# Patient Record
Sex: MALE | Race: ASIAN | NOT HISPANIC OR LATINO | Employment: OTHER | URBAN - METROPOLITAN AREA
[De-identification: names, ages, dates, MRNs, and addresses within clinical notes are randomized per-mention and may not be internally consistent; named-entity substitution may affect disease eponyms.]

---

## 2017-06-15 ASSESSMENT — ENCOUNTER SYMPTOMS
NECK PAIN: 0
COUGH DISTURBING SLEEP: 1
POSTURAL DYSPNEA: 0
HEMATURIA: 0
ABDOMINAL PAIN: 0
BOWEL INCONTINENCE: 0
RECTAL PAIN: 0
NAUSEA: 0
BACK PAIN: 1
ARTHRALGIAS: 0
SINUS PAIN: 0
JOINT SWELLING: 0
SNORES LOUDLY: 1
TASTE DISTURBANCE: 0
HEARTBURN: 0
NECK MASS: 0
WHEEZING: 0
HOARSE VOICE: 1
TROUBLE SWALLOWING: 0
MUSCLE WEAKNESS: 0
COUGH: 1
DYSPNEA ON EXERTION: 0
JAUNDICE: 0
FLANK PAIN: 0
SINUS CONGESTION: 0
VOMITING: 0
SHORTNESS OF BREATH: 0
STIFFNESS: 0
RECTAL BLEEDING: 0
MUSCLE CRAMPS: 0
SORE THROAT: 0
MYALGIAS: 0
SPUTUM PRODUCTION: 1
RESPIRATORY PAIN: 0
CONSTIPATION: 1
HEMOPTYSIS: 0
DIFFICULTY URINATING: 0
DYSURIA: 0
BLOOD IN STOOL: 0
DIARRHEA: 0
SMELL DISTURBANCE: 0
BLOATING: 0

## 2017-06-20 ENCOUNTER — APPOINTMENT (OUTPATIENT)
Dept: LAB | Facility: CLINIC | Age: 77
End: 2017-06-20
Attending: PHYSICIAN ASSISTANT
Payer: COMMERCIAL

## 2017-06-20 ENCOUNTER — ONCOLOGY VISIT (OUTPATIENT)
Dept: ONCOLOGY | Facility: CLINIC | Age: 77
End: 2017-06-20
Attending: PHYSICIAN ASSISTANT
Payer: COMMERCIAL

## 2017-06-20 VITALS
TEMPERATURE: 98.2 F | SYSTOLIC BLOOD PRESSURE: 109 MMHG | WEIGHT: 141.3 LBS | HEART RATE: 80 BPM | RESPIRATION RATE: 14 BRPM | DIASTOLIC BLOOD PRESSURE: 70 MMHG | OXYGEN SATURATION: 96 % | HEIGHT: 65 IN | BODY MASS INDEX: 23.54 KG/M2

## 2017-06-20 DIAGNOSIS — C12 SQUAMOUS CELL CARCINOMA OF PYRIFORM SINUS (H): Primary | ICD-10-CM

## 2017-06-20 DIAGNOSIS — C12 MALIGNANT NEOPLASM OF PYRIFORM SINUS (H): ICD-10-CM

## 2017-06-20 LAB
ALBUMIN SERPL-MCNC: 3.7 G/DL (ref 3.4–5)
ALP SERPL-CCNC: 59 U/L (ref 40–150)
ALT SERPL W P-5'-P-CCNC: 22 U/L (ref 0–70)
ANION GAP SERPL CALCULATED.3IONS-SCNC: 7 MMOL/L (ref 3–14)
AST SERPL W P-5'-P-CCNC: 16 U/L (ref 0–45)
BASOPHILS # BLD AUTO: 0 10E9/L (ref 0–0.2)
BASOPHILS NFR BLD AUTO: 0.4 %
BILIRUB SERPL-MCNC: 0.6 MG/DL (ref 0.2–1.3)
BUN SERPL-MCNC: 14 MG/DL (ref 7–30)
CALCIUM SERPL-MCNC: 9 MG/DL (ref 8.5–10.1)
CHLORIDE SERPL-SCNC: 102 MMOL/L (ref 94–109)
CO2 SERPL-SCNC: 26 MMOL/L (ref 20–32)
CREAT SERPL-MCNC: 0.8 MG/DL (ref 0.66–1.25)
DIFFERENTIAL METHOD BLD: NORMAL
EOSINOPHIL # BLD AUTO: 0.2 10E9/L (ref 0–0.7)
EOSINOPHIL NFR BLD AUTO: 3.7 %
ERYTHROCYTE [DISTWIDTH] IN BLOOD BY AUTOMATED COUNT: 13.2 % (ref 10–15)
GFR SERPL CREATININE-BSD FRML MDRD: ABNORMAL ML/MIN/1.7M2
GLUCOSE SERPL-MCNC: 113 MG/DL (ref 70–99)
HCT VFR BLD AUTO: 41.3 % (ref 40–53)
HGB BLD-MCNC: 14.2 G/DL (ref 13.3–17.7)
IMM GRANULOCYTES # BLD: 0 10E9/L (ref 0–0.4)
IMM GRANULOCYTES NFR BLD: 0.2 %
LYMPHOCYTES # BLD AUTO: 1.1 10E9/L (ref 0.8–5.3)
LYMPHOCYTES NFR BLD AUTO: 24.4 %
MCH RBC QN AUTO: 31.2 PG (ref 26.5–33)
MCHC RBC AUTO-ENTMCNC: 34.4 G/DL (ref 31.5–36.5)
MCV RBC AUTO: 91 FL (ref 78–100)
MONOCYTES # BLD AUTO: 0.4 10E9/L (ref 0–1.3)
MONOCYTES NFR BLD AUTO: 8 %
NEUTROPHILS # BLD AUTO: 2.9 10E9/L (ref 1.6–8.3)
NEUTROPHILS NFR BLD AUTO: 63.3 %
NRBC # BLD AUTO: 0 10*3/UL
NRBC BLD AUTO-RTO: 0 /100
PLATELET # BLD AUTO: 163 10E9/L (ref 150–450)
POTASSIUM SERPL-SCNC: 4 MMOL/L (ref 3.4–5.3)
PROT SERPL-MCNC: 6.8 G/DL (ref 6.8–8.8)
RBC # BLD AUTO: 4.55 10E12/L (ref 4.4–5.9)
SODIUM SERPL-SCNC: 135 MMOL/L (ref 133–144)
T4 FREE SERPL-MCNC: 0.82 NG/DL (ref 0.76–1.46)
TSH SERPL DL<=0.005 MIU/L-ACNC: 4.52 MU/L (ref 0.4–4)
WBC # BLD AUTO: 4.6 10E9/L (ref 4–11)

## 2017-06-20 PROCEDURE — 85025 COMPLETE CBC W/AUTO DIFF WBC: CPT | Performed by: INTERNAL MEDICINE

## 2017-06-20 PROCEDURE — 80053 COMPREHEN METABOLIC PANEL: CPT | Performed by: INTERNAL MEDICINE

## 2017-06-20 PROCEDURE — 84443 ASSAY THYROID STIM HORMONE: CPT | Performed by: INTERNAL MEDICINE

## 2017-06-20 PROCEDURE — 36415 COLL VENOUS BLD VENIPUNCTURE: CPT | Performed by: INTERNAL MEDICINE

## 2017-06-20 PROCEDURE — 84439 ASSAY OF FREE THYROXINE: CPT | Performed by: INTERNAL MEDICINE

## 2017-06-20 PROCEDURE — 99212 OFFICE O/P EST SF 10 MIN: CPT | Mod: ZF

## 2017-06-20 PROCEDURE — 99214 OFFICE O/P EST MOD 30 MIN: CPT | Mod: ZP | Performed by: PHYSICIAN ASSISTANT

## 2017-06-20 ASSESSMENT — PAIN SCALES - GENERAL: PAINLEVEL: NO PAIN (0)

## 2017-06-20 NOTE — LETTER
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  -At the beginning of 2015 he developed left-sided odynophagia. He saw Dr. Cailin Rivera in ENT in [place], who performed a flexible endoscopy which revealed a lesion in the left false vocal cord and postcricoid region.   - 03/18/15 diagnosed with SCC (keratinizing type with ulcerstion) by Left supraglottic bx   - There was submucosal edema and fullness with tumor at the left arytenoid and aryepiglottic fold extending into the left piriform sinus. There was an exophytic lesion visualized in the base of the piriform sinus and extending up to the lateral left postcricoid region.   - 03/24/2015 PET/CT showed 15 mm hypermetabolic mass in the left piriform sinus and 16 mm hypermetabolic left level IIA lymph node. No distant mets. There was nonspecific focal FDG uptake in the hilar regions without gross segundo formation, however evaluation on CT is partially limited given lack of IV contrast.   - Stage was NORAH ([id number])  - 4/15/15 started chemoradiation with HD Cisplatin. He was switched to weekly cisplatin with 40 mg /m2 on 5/27/15 for worsening hearing loss and tinnitus.   - 6/3/2015 completed therapy  - 8/27/15 PET scan showed a residual activity in the neck LN. Also there were multiple concerning hilar LN.   - 9/9/15 left modified radical neck dissection. Pathology negative for viable tumor  -scans most recently 12/2016 no signs of recurrence on CT chest and CT neck.     Interval History: Chema is here with his son and wife in routine follow-up. He was last seen by Dr. Willingham 6 months ago. He spent the last 4.5  "months in [facility], returning 3 weeks ago. He had bronchitis vs early PNA in [facility] and was treated with antibiotics. He had CXR showing resolution. He otherwise has no health updates. He overall has been doing well. He continues to have intermittent cough from PND that has been stable since treatment. His swallowing, dysgeusia, and dysphagia have improved. He uses pilocarpine for dry mouth which helps.  He denies any throat pain or sores or new lumps bumps in the head and neck region. No SOB, COLE or chest pain/pressure. He is eating and drinking well. Has some constipation which is managed with stool softeners once a week.    Review Of Systems  10-point review of systems were negative except as noted in HPI.    EXAM:  Blood pressure 109/70, pulse 80, temperature 98.2  F (36.8  C), temperature source Oral, resp. rate 14, height 1.651 m (5' 5\"), weight 64.1 kg (141 lb 4.8 oz), SpO2 96 %.   Wt Readings from Last 10 Encounters:   06/20/17 64.1 kg (141 lb 4.8 oz)   12/21/16 63 kg (139 lb)   12/20/16 63.8 kg (140 lb 11.2 oz)   09/13/16 62.3 kg (137 lb 6.4 oz)   09/09/16 61.3 kg (135 lb 2.3 oz)   09/07/16 61 kg (134 lb 6.4 oz)   08/10/16 61.7 kg (136 lb)   06/08/16 62.6 kg (138 lb)   06/07/16 61.9 kg (136 lb 6.4 oz)   10/21/15 63.8 kg (140 lb 11.2 oz)     GEN: alert and oriented x 3, nad  HEENT: perrla, eomi, sclera anicteric, oral mucosa moist without thrush  NECK: supple, no palpable LAD, mild lymphedema around incision   HT: reg rate and rhythm, no murmurs  LUNGS: clear to auscultation bilaterally  ABD: soft, nt, nd, +bs x 4  EXT: no clubbing, cyanosis, or edema  NEURO: CN 2-12 intact, MS 5/5 b/l    Current Outpatient Prescriptions   Medication Sig Dispense Refill     pilocarpine (SALAGEN) 5 MG tablet Take 1 tablet (5 mg) by mouth 4 times daily as needed 120 tablet 1     multivitamin, therapeutic with minerals (MULTI-VITAMIN) TABS Take 1 tablet by mouth daily         LABS:    6/20/2017 14:12   Sodium 135   Potassium 4.0 "   Chloride 102   Carbon Dioxide 26   Urea Nitrogen 14   Creatinine 0.80   GFR Estimate >90...   GFR Estimate If Black >90...   Calcium 9.0   Anion Gap 7   Albumin 3.7   Protein Total 6.8   Bilirubin Total 0.6   Alkaline Phosphatase 59   ALT 22   AST 16   T4 Free 0.82   TSH 4.52 (H)   Glucose 113 (H)   WBC 4.6   Hemoglobin 14.2   Hematocrit 41.3   Platelet Count 163   RBC Count 4.55   MCV 91   MCH 31.2   MCHC 34.4   RDW 13.2   Diff Method Automated Method   % Neutrophils 63.3   % Lymphocytes 24.4   % Monocytes 8.0   % Eosinophils 3.7   % Basophils 0.4   % Immature Granulocytes 0.2   Nucleated RBCs 0   Absolute Neutrophil 2.9   Absolute Lymphocytes 1.1   Absolute Monocytes 0.4   Absolute Eosinophils 0.2   Absolute Basophils 0.0   Abs Immature Granulocytes 0.0   Absolute Nucleated RBC 0.0       Assessment/Plan  Stage Faraz sq cell ca of the hypopharynx - He has not had evidence of disease on his scans since treatment. Most recent CT chest and neck were performed in December and were clear. He completed therapy in 5/2015. He will return in December with restaging scans again at that time. He continues to follow with ENT and mets with Dr. Wynne in August.. His labs are WNL. TSH is mildly elevated, however free T4 normal. Recheck in 6 months. He will call with any interval concerns.     Nanda Mckee PA-C  [facility]  [address] [place] [id number]  [phone]

## 2017-06-20 NOTE — MR AVS SNAPSHOT
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)    -  1    Malignant neoplasm of pyriform sinus (H)           Follow-ups after your visit        Your next 10 appointments already scheduled     Aug 16, 2017 10:00 AM CDT   (Arrive by 9:45 AM)   RETURN TUMOR VISIT with Boone Wynne MD   [facility] ([facility])    [address]  4th Floor  [facility] [address]   [phone]            Dec 26, 2017 11:30 AM CST   [facility] Draw with  [email or web address] LAB DRAW   [facility] Lab Draw ([facility])    [address]  2nd Floor  [facility] [address] [phone]            Dec 26, 2017 12:00 PM CST   (Arrive by 11:45 AM)   CT CHEST W CONTRAST with [email or web address]   [facility] [place] [facility] ([facility])    [address]  1st Floor  [facility] [address] [phone]           Please bring any scans or X-rays taken at other hospitals, if similar tests were done. Also bring a list of your medicines, including vitamins, minerals and over-the-counter drugs. It is safest to leave personal items at home.  Be sure to tell your doctor:   If you have any allergies.   If there s any chance you are pregnant.   If you are breastfeeding.   If you have any special needs.  You will have contrast for this exam. To prepare:   Do not eat or drink for 2 hours before your exam. If you need to take medicine, you may take it with small sips of water. (We may ask you to take liquid medicine as well.)   The day before your exam, drink extra fluids at least six 8-ounce glasses (unless your doctor tells you to restrict your  fluids).  Patients over 70 or patients with diabetes or kidney problems:   If you haven t had a blood test (creatinine test) within the last 30 days, go to your clinic or Diagnostic Imaging Department for this test.  If you have diabetes:   If your kidney function is normal, continue taking your metformin (Avandamet, Glucophage, Glucovance, Metaglip) on the day of your exam.   If your kidney function is abnormal, wait 48 hours before restarting this medicine.  Please wear loose clothing, such as a sweat suit or jogging clothes. Avoid snaps, zippers and other metal. We may ask you to undress and put on a hospital gown.  If you have any questions, please call the Imaging Department where you will have your exam.            Dec 26, 2017 12:20 PM CST   (Arrive by 12:05 PM)   CT SOFT TISSUE NECK W CONTRAST with UCCT2   [facility] Imaging [place] [facility] ([facility])    [address]  [facility] [address] [phone]           Please bring any scans or X-rays taken at other hospitals, if similar tests were done. Also bring a list of your medicines, including vitamins, minerals and over-the-counter drugs. It is safest to leave personal items at home.  Be sure to tell your doctor:   If you have any allergies.   If there s any chance you are pregnant.   If you are breastfeeding.   If you have any special needs.  You will have contrast for this exam. To prepare:   Do not eat or drink for 2 hours before your exam. If you need to take medicine, you may take it with small sips of water. (We may ask you to take liquid medicine as well.)   The day before your exam, drink extra fluids at least six 8-ounce glasses (unless your doctor tells you to restrict your fluids).  Patients over 70 or patients with diabetes or kidney problems:   If you haven t had a blood test (creatinine test) within the last 30 days, go to your clinic or Diagnostic Imaging Department for this test.  If you have  diabetes:   If your kidney function is normal, continue taking your metformin (Avandamet, Glucophage, Glucovance, Metaglip) on the day of your exam.   If your kidney function is abnormal, wait 48 hours before restarting this medicine.  Please wear loose clothing, such as a sweat suit or jogging clothes. Avoid snaps, zippers and other metal. We may ask you to undress and put on a hospital gown.  If you have any questions, please call the Imaging Department where you will have your exam.            Dec 26, 2017  3:00 PM CST   (Arrive by 2:45 PM)   Return Visit with Navarro Willingham DO   [facility] ([facility])    [address]  2nd [facility] [address] [phone]              Future tests that were ordered for you today     Open Future Orders        Priority Expected Expires Ordered    CBC with platelets differential Routine 12/20/2017 1/20/2018 6/20/2017    Comprehensive metabolic panel Routine 12/20/2017 1/20/2018 6/20/2017    TSH with free T4 reflex Routine 12/20/2017 1/20/2018 6/20/2017    CT Chest w contrast Routine 12/20/2017 1/20/2018 6/20/2017    CT Soft tissue neck w contrast* Routine 12/20/2017 1/20/2018 6/20/2017            Who to contact     If you have questions or need follow up information about today's clinic visit or your schedule please contact [facility] directly at [phone].  Normal or non-critical lab and imaging results will be communicated to you by MyChart, letter or phone within 4 business days after the clinic has received the results. If you do not hear from us within 7 days, please contact the clinic through MyChart or phone. If you have a critical or abnormal lab result, we will notify you by phone as soon as possible.  Submit refill requests through [employer] or call your pharmacy and they will forward the refill request to us. Please allow 3 business days for your refill to be completed.        "   Additional Information About Your Visit        MyChart Information     [email or web address] gives you secure access to your electronic health record. If you see a primary care provider, you can also send messages to your care team and make appointments. If you have questions, please call your primary care clinic.  If you do not have a primary care provider, please call [phone] and they will assist you.        Care EveryWhere ID     This is your Care EveryWhere ID. This could be used by other organizations to access your [place] medical records  [id number]        Your Vitals Were     Pulse Temperature Respirations Height Pulse Oximetry BMI (Body Mass Index)    80 98.2  F (36.8  C) (Oral) 14 1.651 m (5' 5\") 96% 23.51 kg/m2       Blood Pressure from Last 3 Encounters:   06/20/17 109/70   12/20/16 136/72   09/13/16 127/74    Weight from Last 3 Encounters:   06/20/17 64.1 kg (141 lb 4.8 oz)   12/21/16 63 kg (139 lb)   12/20/16 63.8 kg (140 lb 11.2 oz)              We Performed the Following     CBC with platelets differential     Comprehensive metabolic panel     T4 free     TSH with free T4 reflex          Today's Medication Changes          These changes are accurate as of: 6/20/17 11:59 PM.  If you have any questions, ask your nurse or doctor.               Stop taking these medicines if you haven't already. Please contact your care team if you have questions.     methylPREDNISolone 32 MG tablet   Commonly known as:  MEDROL   Stopped by:  Nanda Mckee PA           omeprazole 20 MG CR capsule   Commonly known as:  priLOSEC   Stopped by:  Nanda Mckee PA                    Primary Care Provider Office Phone # Fax #    Luzmaria Frantz Morrissey [phone]       [facility] [address]  [facility] [place] [address]        Equal Access to Services     BECCA MCKNIGHT AH: Iva Welch, watawanada liam, qaybta melvin tee " lauriah smith. So [facility] [phone].    ATENCIÓN: Si habla shabnam, tiene a martin disposición servicios gratuitos de asistencia lingüística. Lloyd al [phone].    We comply with applicable federal civil rights laws and [place] laws. We do not discriminate on the basis of race, color, national origin, age, disability sex, sexual orientation or gender identity.            Thank you!     Thank you for choosing [facility]  for your care. Our goal is always to provide you with excellent care. Hearing back from our patients is one way we can continue to improve our services. Please take a few minutes to complete the written survey that you may receive in the mail after your visit with us. Thank you!             Your Updated Medication List - Protect others around you: Learn how to safely use, store and throw away your medicines at www.disposemymeds.org.          This list is accurate as of: 6/20/17 11:59 PM.  Always use your most recent med list.                   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

## 2017-06-20 NOTE — NURSING NOTE
Labs and vitals obtained without complication.    See flowsheets.    Sharon Costa CMA ([facility])

## 2017-06-20 NOTE — NURSING NOTE
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    Weight as of this encounter: 64.1 kg (141 lb 4.8 oz). Body surface area is 1.71 meters squared.  No Pain (0) Comment: Data Unavailable   No LMP for male patient.  Allergies reviewed: Yes  Medications reviewed: Yes    Medications: Medication refills not needed today.  Pharmacy name entered into [facility]:    [facility] [place], [place] - [phone]  [facility] [address] [place] [address]    Clinical concerns: none martina was notified.    5 minutes for nursing intake (face to face time)     Joseph Shine CMA              "

## 2017-06-22 NOTE — PROGRESS NOTES
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  -At the beginning of 2015 he developed left-sided odynophagia. He saw Dr. Cailin Rivera in ENT in [place], who performed a flexible endoscopy which revealed a lesion in the left false vocal cord and postcricoid region.   - 03/18/15 diagnosed with SCC (keratinizing type with ulcerstion) by Left supraglottic bx   - There was submucosal edema and fullness with tumor at the left arytenoid and aryepiglottic fold extending into the left piriform sinus. There was an exophytic lesion visualized in the base of the piriform sinus and extending up to the lateral left postcricoid region.   - 03/24/2015 PET/CT showed 15 mm hypermetabolic mass in the left piriform sinus and 16 mm hypermetabolic left level IIA lymph node. No distant mets. There was nonspecific focal FDG uptake in the hilar regions without gross segundo formation, however evaluation on CT is partially limited given lack of IV contrast.   - Stage was NORAH ([id number])  - 4/15/15 started chemoradiation with HD Cisplatin. He was switched to weekly cisplatin with 40 mg /m2 on 5/27/15 for worsening hearing loss and tinnitus.   - 6/3/2015 completed therapy  - 8/27/15 PET scan showed a residual activity in the neck LN. Also there were multiple concerning hilar LN.   - 9/9/15 left modified radical neck dissection. Pathology negative for viable tumor  -scans most recently 12/2016 no signs of recurrence on CT chest and CT neck.     Interval History: Chema is here with his son and wife in routine follow-up. He was last seen by Dr. Willingham 6 months ago. He spent the last 4.5 months in [facility], returning 3 weeks ago. He had bronchitis vs early PNA in [facility] and  "was treated with antibiotics. He had CXR showing resolution. He otherwise has no health updates. He overall has been doing well. He continues to have intermittent cough from PND that has been stable since treatment. His swallowing, dysgeusia, and dysphagia have improved. He uses pilocarpine for dry mouth which helps.  He denies any throat pain or sores or new lumps bumps in the head and neck region. No SOB, COLE or chest pain/pressure. He is eating and drinking well. Has some constipation which is managed with stool softeners once a week.    Review Of Systems  10-point review of systems were negative except as noted in HPI.    EXAM:  Blood pressure 109/70, pulse 80, temperature 98.2  F (36.8  C), temperature source Oral, resp. rate 14, height 1.651 m (5' 5\"), weight 64.1 kg (141 lb 4.8 oz), SpO2 96 %.   Wt Readings from Last 10 Encounters:   06/20/17 64.1 kg (141 lb 4.8 oz)   12/21/16 63 kg (139 lb)   12/20/16 63.8 kg (140 lb 11.2 oz)   09/13/16 62.3 kg (137 lb 6.4 oz)   09/09/16 61.3 kg (135 lb 2.3 oz)   09/07/16 61 kg (134 lb 6.4 oz)   08/10/16 61.7 kg (136 lb)   06/08/16 62.6 kg (138 lb)   06/07/16 61.9 kg (136 lb 6.4 oz)   10/21/15 63.8 kg (140 lb 11.2 oz)     GEN: alert and oriented x 3, nad  HEENT: perrla, eomi, sclera anicteric, oral mucosa moist without thrush  NECK: supple, no palpable LAD, mild lymphedema around incision   HT: reg rate and rhythm, no murmurs  LUNGS: clear to auscultation bilaterally  ABD: soft, nt, nd, +bs x 4  EXT: no clubbing, cyanosis, or edema  NEURO: CN 2-12 intact, MS 5/5 b/l    Current Outpatient Prescriptions   Medication Sig Dispense Refill     pilocarpine (SALAGEN) 5 MG tablet Take 1 tablet (5 mg) by mouth 4 times daily as needed 120 tablet 1     multivitamin, therapeutic with minerals (MULTI-VITAMIN) TABS Take 1 tablet by mouth daily         LABS:    6/20/2017 14:12   Sodium 135   Potassium 4.0   Chloride 102   Carbon Dioxide 26   Urea Nitrogen 14   Creatinine 0.80   GFR Estimate " >90...   GFR Estimate If Black >90...   Calcium 9.0   Anion Gap 7   Albumin 3.7   Protein Total 6.8   Bilirubin Total 0.6   Alkaline Phosphatase 59   ALT 22   AST 16   T4 Free 0.82   TSH 4.52 (H)   Glucose 113 (H)   WBC 4.6   Hemoglobin 14.2   Hematocrit 41.3   Platelet Count 163   RBC Count 4.55   MCV 91   MCH 31.2   MCHC 34.4   RDW 13.2   Diff Method Automated Method   % Neutrophils 63.3   % Lymphocytes 24.4   % Monocytes 8.0   % Eosinophils 3.7   % Basophils 0.4   % Immature Granulocytes 0.2   Nucleated RBCs 0   Absolute Neutrophil 2.9   Absolute Lymphocytes 1.1   Absolute Monocytes 0.4   Absolute Eosinophils 0.2   Absolute Basophils 0.0   Abs Immature Granulocytes 0.0   Absolute Nucleated RBC 0.0       Assessment/Plan  Stage Faraz sq cell ca of the hypopharynx - He has not had evidence of disease on his scans since treatment. Most recent CT chest and neck were performed in December and were clear. He completed therapy in 5/2015. He will return in December with restaging scans again at that time. He continues to follow with ENT and mets with Dr. Wynne in August.. His labs are WNL. TSH is mildly elevated, however free T4 normal. Recheck in 6 months. He will call with any interval concerns.     Nanda Mckee PA-C  [facility] [address] [address] [phone]

## 2017-08-16 ENCOUNTER — THERAPY VISIT (OUTPATIENT)
Dept: SPEECH THERAPY | Facility: CLINIC | Age: 77
End: 2017-08-16

## 2017-08-16 ENCOUNTER — OFFICE VISIT (OUTPATIENT)
Dept: OTOLARYNGOLOGY | Facility: CLINIC | Age: 77
End: 2017-08-16

## 2017-08-16 DIAGNOSIS — C12 MALIGNANT NEOPLASM OF PYRIFORM SINUS (H): Primary | ICD-10-CM

## 2017-08-16 DIAGNOSIS — R13.12 OROPHARYNGEAL DYSPHAGIA: Primary | ICD-10-CM

## 2017-08-16 DIAGNOSIS — C13.9 SQUAMOUS CELL CANCER OF HYPOPHARYNX (H): ICD-10-CM

## 2017-08-16 NOTE — PATIENT INSTRUCTIONS
1.  You were seen in the ENT Clinic today by Dr. Wynne.  If you have any questions or concerns after your appointment, please call [phone].  Press option #1 for scheduling related needs.  Press option #3 for Nurse advice.  2.  Plan is to move CT scans scheduled in December up to early September.      Fina HENDERSON, RN  [facility] ENT   Head & Neck Surgery

## 2017-08-16 NOTE — LETTER
8/16/2017       RE: Chema Morales  [address]  [facility] [address]     Dear Colleague,    Thank you for referring your patient, Chema Morales, to the [facility] EAR NOSE AND THROAT at [facility]. Please see a copy of my visit note below.    August 16, 2017      PRIOR HISTORY:  Mr. 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  He did quite well, but a residual node was noted at the three-month PET CT scan, and he underwent a neck dissection on 09/09/2015.  Pathology from the neck dissection on September 9 shows 1 out of 3 nodes being positive and that node containing nonviable tumor.      HISTORY OF PRESENT ILLNESS:    Mr. Morales has been doing very well.  He and his wife returned from [facility] in early summer.  This is their first visit back since December of last year.  He is now almost two years out from the salvage neck dissection.      He generally feels quite well and has had no difficulties.  He admits to having stopped doing his swallowing exercises and his physical therapy.  He notes that the scar band on the left anterior neck is a little bit more prominent and recently started massaging it again.      He is having no difficulty swallowing.  His speech is good.  His energy level is good.  He has no pain and no hemoptysis or changes in his voice.  He is very, very happy with how well he feels.      PHYSICAL EXAMINATION:  He looks terrific.  His son and his wife are with him.  The oral cavity and oropharynx look fantastic.  There are no mucosal lesions.  Everything is very mobile.  I felt the base of tongue and tonsillar area, but I could not feel any further back since it was too uncomfortable for me feel the vallecula and epiglottis.  The neck on the left side continues to feel very indurated, especially  along the anterior border of the left SCM, where the sternal head is very scarred.  The right side feels much more normal.  We talked extensively about massaging the area of scar.      FIBEROPTIC ENDOSCOPY:  Due to the need for careful surveillance, fiberoptic laryngoscopy was indicated. The nose was topically decongested and anesthetized. The fiberoptic laryngoscope was passed on the left under endoscopic vision. The turbinates were normal.  The inferior and middle meati were clear bilaterally without purulence, masses, or polyps.  The nasopharynx was clear.  The eustachian tubes were clear. The soft palate appeared normal with good mobility. The epiglottis and cords are very sharp.  The difference between the examination this time and last time is that the left piriform sinus is not as open.  I could slip it through a slit-like opening between the AE fold and the wall and evaluate the piriform, which was clear, but it was definitely harder to enter this time than it was last time.      IMPRESSION AND PLAN:  Mr. Morales is ENRIQUE by examination at 23 months.  We will get his two year scans in a month or two.  I will see him back before he leaves again for [facility] in December.      I would like him to work with Carmen on swallowing exercises and range of motion exercises and massage the left neck on his own.  I explained and I think he now understands that if he does not do this, he is at risk of having much worse swallowing long-term as well as scarring and immobility of the left neck.       Boone Wynne MD  Otolaryngology/Head & Neck Surgery  [phone]    CC  Delfina Rhodes MD    Otolaryngology Clinic   [facility] 396             Pawan Morrissey MD   [address], [place] [address]      Joanie Yeboah MD   [facility] 400

## 2017-08-16 NOTE — MR AVS SNAPSHOT
After Visit Summary   8/16/2017    Chema Morales    MRN: [id number]           Patient Information     Date Of Birth          1940        Visit Information        Provider Department      8/16/2017 10:00 AM Boone Wynne MD [facility] Ear Nose and Throat        Today's Diagnoses     Malignant neoplasm of pyriform sinus (H)    -  1      Care Instructions    1.  You were seen in the ENT Clinic today by Dr. Wynne.  If you have any questions or concerns after your appointment, please call [phone].  Press option #1 for scheduling related needs.  Press option #3 for Nurse advice.  2.  Plan is to move CT scans scheduled in December up to early September.      Fina HENDERSON, RN  [facility] ENT   Head & Neck Surgery               Follow-ups after your visit        Your next 10 appointments already scheduled     Sep 27, 2017  9:00 AM CDT   (Arrive by 8:45 AM)   CT CHEST W CONTRAST with UCCT2   [facility] [place] [address] ([facility])    [address]  [facility] [address] [phone]           Please bring any scans or X-rays taken at other hospitals, if similar tests were done. Also bring a list of your medicines, including vitamins, minerals and over-the-counter drugs. It is safest to leave personal items at home.  Be sure to tell your doctor:   If you have any allergies.   If there s any chance you are pregnant.   If you are breastfeeding.   If you have any special needs.  You will have contrast for this exam. To prepare:   Do not eat or drink for 2 hours before your exam. If you need to take medicine, you may take it with small sips of water. (We may ask you to take liquid medicine as well.)   The day before your exam, drink extra fluids at least six 8-ounce glasses (unless your doctor tells you to restrict your fluids).  Patients over 70 or patients with diabetes or kidney problems:   If you haven t had a blood test (creatinine  test) within the last 30 days, go to your clinic or Diagnostic Imaging Department for this test.  If you have diabetes:   If your kidney function is normal, continue taking your metformin (Avandamet, Glucophage, Glucovance, Metaglip) on the day of your exam.   If your kidney function is abnormal, wait 48 hours before restarting this medicine.  Please wear loose clothing, such as a sweat suit or jogging clothes. Avoid snaps, zippers and other metal. We may ask you to undress and put on a hospital gown.  If you have any questions, please call the Imaging Department where you will have your exam.            Sep 27, 2017  9:20 AM CDT   (Arrive by 9:05 AM)   CT SOFT TISSUE NECK W CONTRAST with UCCT2   [facility] CT ([facility])    [address] [address] [phone]           Please bring any scans or X-rays taken at other hospitals, if similar tests were done. Also bring a list of your medicines, including vitamins, minerals and over-the-counter drugs. It is safest to leave personal items at home.  Be sure to tell your doctor:   If you have any allergies.   If there s any chance you are pregnant.   If you are breastfeeding.   If you have any special needs.  You will have contrast for this exam. To prepare:   Do not eat or drink for 2 hours before your exam. If you need to take medicine, you may take it with small sips of water. (We may ask you to take liquid medicine as well.)   The day before your exam, drink extra fluids at least six 8-ounce glasses (unless your doctor tells you to restrict your fluids).  Patients over 70 or patients with diabetes or kidney problems:   If you haven t had a blood test (creatinine test) within the last 30 days, go to your clinic or Diagnostic Imaging Department for this test.  If you have diabetes:   If your kidney function is normal, continue taking your metformin (Avandamet, Glucophage, Glucovance, Metaglip)  on the day of your exam.   If your kidney function is abnormal, wait 48 hours before restarting this medicine.  Please wear loose clothing, such as a sweat suit or jogging clothes. Avoid snaps, zippers and other metal. We may ask you to undress and put on a hospital gown.  If you have any questions, please call the Imaging Department where you will have your exam.            Dec 05, 2017  2:30 PM CST   Masonic Lab Draw with  MASONIC LAB DRAW   [facility] ([facility])    [address] [place] [id number] [phone]            Dec 05, 2017  3:00 PM CST   (Arrive by 2:45 PM)   Return Visit with Navarro Willingham DO   [facility] ([facility])    [address] [place] [address] [phone]            Dec 06, 2017  8:20 AM CST   (Arrive by 8:05 AM)   RETURN TUMOR VISIT with Boone Wynne MD   [facility] Ear Nose and Throat ([facility])    [address] [place] [id number] [phone]            Dec 26, 2017 11:30 AM CST   Masonic Lab Draw with  MASONIC LAB DRAW   [facility] Masonic Lab Draw ([facility])    [address] [place] [id number] [phone]              Who to contact     Please call your clinic at [phone] to:    Ask questions about your health    Make or cancel appointments    Discuss your medicines    Learn about your test results    Speak to your doctor   If you have compliments or concerns about an experience at your clinic, or if you wish to file a complaint, please contact [facility] Patient Relations at [phone] or email us at [email or web address]         Additional Information About Your Visit        MyChart Information     World Reviewerhart gives you secure access to your electronic health record. If you see a primary  care provider, you can also send messages to your care team and make appointments. If you have questions, please call your primary care clinic.  If you do not have a primary care provider, please call [phone] and they will assist you.      Metabolomx is an electronic gateway that provides easy, online access to your medical records. With Metabolomx, you can request a clinic appointment, read your test results, renew a prescription or communicate with your care team.     To access your existing account, please contact your [facility] or call [phone] for assistance.        Care EveryWhere ID     This is your Care EveryWhere ID. This could be used by other organizations to access your [place] medical records  [id number]         Blood Pressure from Last 3 Encounters:   06/20/17 109/70   12/20/16 136/72   09/13/16 127/74    Weight from Last 3 Encounters:   06/20/17 64.1 kg (141 lb 4.8 oz)   12/21/16 63 kg (139 lb)   12/20/16 63.8 kg (140 lb 11.2 oz)              We Performed the Following     IMAGESTREAM RECORDING ORDER        Primary Care Provider Office Phone # Fax #    Luzmaria Frantz Morrissey [phone]       [address] [address]        Equal Access to Services     BECCA MCKNIGHT : Hadii maria victoria ku hadasho Soomaali, waaxda luqadaha, qaybta kaalmada adeegyada, melvin smith. So [facility] [phone].    ATENCIÓN: Si habla español, tiene a martin disposición servicios gratuitos de asistencia lingüística. Llame al [phone].    We comply with applicable federal civil rights laws and [place] laws. We do not discriminate on the basis of race, color, national origin, age, disability sex, sexual orientation or gender identity.            Thank you!     Thank you for choosing [facility] EAR NOSE AND THROAT  for your care. Our goal is always to provide you with excellent care. Hearing back from our patients is one way we can  continue to improve our services. Please take a few minutes to complete the written survey that you may receive in the mail after your visit with us. Thank you!             Your Updated Medication List - Protect others around you: Learn how to safely use, store and throw away your medicines at www.disposemymeds.org.          This list is accurate as of: 8/16/17 10:33 AM.  Always use your most recent med list.                   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

## 2017-08-16 NOTE — NURSING NOTE
Chief Complaint   Patient presents with     RECHECK     follow up     Gracia Robles Medical Assistant

## 2017-08-16 NOTE — PROGRESS NOTES
August 16, 2017      PRIOR HISTORY:  Mr. 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  He did quite well, but a residual node was noted at the three-month PET CT scan, and he underwent a neck dissection on 09/09/2015.  Pathology from the neck dissection on September 9 shows 1 out of 3 nodes being positive and that node containing nonviable tumor.      HISTORY OF PRESENT ILLNESS:    Mr. Morales has been doing very well.  He and his wife returned from [facility] in early summer.  This is their first visit back since December of last year.  He is now almost two years out from the salvage neck dissection.      He generally feels quite well and has had no difficulties.  He admits to having stopped doing his swallowing exercises and his physical therapy.  He notes that the scar band on the left anterior neck is a little bit more prominent and recently started massaging it again.      He is having no difficulty swallowing.  His speech is good.  His energy level is good.  He has no pain and no hemoptysis or changes in his voice.  He is very, very happy with how well he feels.      PHYSICAL EXAMINATION:  He looks terrific.  His son and his wife are with him.  The oral cavity and oropharynx look fantastic.  There are no mucosal lesions.  Everything is very mobile.  I felt the base of tongue and tonsillar area, but I could not feel any further back since it was too uncomfortable for me feel the vallecula and epiglottis.  The neck on the left side continues to feel very indurated, especially along the anterior border of the left SCM, where the sternal head is very scarred.  The right side feels much more normal.  We talked extensively about massaging the area of scar.      FIBEROPTIC ENDOSCOPY:  Due to the need for careful surveillance, fiberoptic laryngoscopy was indicated. The  nose was topically decongested and anesthetized. The fiberoptic laryngoscope was passed on the left under endoscopic vision. The turbinates were normal.  The inferior and middle meati were clear bilaterally without purulence, masses, or polyps.  The nasopharynx was clear.  The eustachian tubes were clear. The soft palate appeared normal with good mobility. The epiglottis and cords are very sharp.  The difference between the examination this time and last time is that the left piriform sinus is not as open.  I could slip it through a slit-like opening between the AE fold and the wall and evaluate the piriform, which was clear, but it was definitely harder to enter this time than it was last time.      IMPRESSION AND PLAN:  Mr. Morales is ENRIQUE by examination at 23 months.  We will get his two year scans in a month or two.  I will see him back before he leaves again for [facility] in December.      I would like him to work with Carmen on swallowing exercises and range of motion exercises and massage the left neck on his own.  I explained and I think he now understands that if he does not do this, he is at risk of having much worse swallowing long-term as well as scarring and immobility of the left neck.       Boone Wynne MD  Otolaryngology/Head & Neck Surgery  [phone]               CC  Delfina Rhodes MD    Otolaryngology Clinic   [facility] 396             Pawan Morrissey MD   [address], [place] [address]      Joanie Yeboah MD   [facility] 400

## 2017-09-06 ENCOUNTER — MYC REFILL (OUTPATIENT)
Dept: ONCOLOGY | Facility: CLINIC | Age: 77
End: 2017-09-06

## 2017-09-06 DIAGNOSIS — C12 MALIGNANT NEOPLASM OF PYRIFORM SINUS (H): ICD-10-CM

## 2017-09-06 DIAGNOSIS — C12 SQUAMOUS CELL CARCINOMA OF PYRIFORM SINUS (H): ICD-10-CM

## 2017-09-07 RX ORDER — PILOCARPINE HYDROCHLORIDE 5 MG/1
5 TABLET, FILM COATED ORAL 4 TIMES DAILY PRN
Qty: 120 TABLET | Refills: 1 | Status: SHIPPED | OUTPATIENT
Start: 2017-09-07 | End: 2017-12-05

## 2017-09-07 NOTE — TELEPHONE ENCOUNTER
Message from MyChart:  Original authorizing provider: DO Chema Jalloh would like a refill of the following medications:  pilocarpine (SALAGEN) 5 MG tablet [Navarro Willingham DO]    Preferred pharmacy: [facility] [id number] [place] [address]    Comment:

## 2017-09-07 NOTE — TELEPHONE ENCOUNTER
pilocarpine      Last Written Prescription Date: 12/20/16  Last Fill Quantity: 120,  # refills: 1   Last Office Visit with [facility], P or [facility] prescribing provider: 6/20/17 with Nanda FERNANDEZ  Next office visit:12/5/17 with Dr. Willingham

## 2017-09-26 DIAGNOSIS — T78.40XA ALLERGIC STATE: Primary | ICD-10-CM

## 2017-09-26 RX ORDER — METHYLPREDNISOLONE 32 MG/1
TABLET ORAL
Qty: 2 TABLET | Refills: 0 | Status: SHIPPED | OUTPATIENT
Start: 2017-09-26

## 2017-11-30 ASSESSMENT — ENCOUNTER SYMPTOMS
BLOOD IN STOOL: 0
NECK MASS: 0
HEARTBURN: 1
SNORES LOUDLY: 1
SPUTUM PRODUCTION: 1
SHORTNESS OF BREATH: 0
WHEEZING: 0
FLANK PAIN: 0
SORE THROAT: 0
HEMOPTYSIS: 0
VOMITING: 0
BLOATING: 0
BOWEL INCONTINENCE: 0
NAUSEA: 0
DIARRHEA: 0
SMELL DISTURBANCE: 0
TASTE DISTURBANCE: 0
DIFFICULTY URINATING: 0
SINUS PAIN: 0
COUGH: 1
DYSPNEA ON EXERTION: 0
ABDOMINAL PAIN: 0
HEMATURIA: 0
POSTURAL DYSPNEA: 0
CONSTIPATION: 1
DYSURIA: 0
TROUBLE SWALLOWING: 0
HOARSE VOICE: 1
JAUNDICE: 0
COUGH DISTURBING SLEEP: 1
RECTAL PAIN: 0
SINUS CONGESTION: 0

## 2017-12-05 ENCOUNTER — ONCOLOGY VISIT (OUTPATIENT)
Dept: ONCOLOGY | Facility: CLINIC | Age: 77
End: 2017-12-05
Attending: INTERNAL MEDICINE
Payer: COMMERCIAL

## 2017-12-05 ENCOUNTER — APPOINTMENT (OUTPATIENT)
Dept: LAB | Facility: CLINIC | Age: 77
End: 2017-12-05
Attending: INTERNAL MEDICINE
Payer: COMMERCIAL

## 2017-12-05 VITALS
WEIGHT: 145.1 LBS | HEART RATE: 72 BPM | TEMPERATURE: 98.2 F | HEIGHT: 65 IN | BODY MASS INDEX: 24.18 KG/M2 | OXYGEN SATURATION: 95 % | SYSTOLIC BLOOD PRESSURE: 132 MMHG | RESPIRATION RATE: 16 BRPM | DIASTOLIC BLOOD PRESSURE: 72 MMHG

## 2017-12-05 DIAGNOSIS — C12 MALIGNANT NEOPLASM OF PYRIFORM SINUS (H): ICD-10-CM

## 2017-12-05 DIAGNOSIS — C12 SQUAMOUS CELL CARCINOMA OF PYRIFORM SINUS (H): ICD-10-CM

## 2017-12-05 LAB
ALBUMIN SERPL-MCNC: 3.7 G/DL (ref 3.4–5)
ALP SERPL-CCNC: 52 U/L (ref 40–150)
ALT SERPL W P-5'-P-CCNC: 18 U/L (ref 0–70)
ANION GAP SERPL CALCULATED.3IONS-SCNC: 7 MMOL/L (ref 3–14)
AST SERPL W P-5'-P-CCNC: 15 U/L (ref 0–45)
BASOPHILS # BLD AUTO: 0 10E9/L (ref 0–0.2)
BASOPHILS NFR BLD AUTO: 0.4 %
BILIRUB SERPL-MCNC: 0.5 MG/DL (ref 0.2–1.3)
BUN SERPL-MCNC: 11 MG/DL (ref 7–30)
CALCIUM SERPL-MCNC: 8.8 MG/DL (ref 8.5–10.1)
CHLORIDE SERPL-SCNC: 102 MMOL/L (ref 94–109)
CO2 SERPL-SCNC: 27 MMOL/L (ref 20–32)
CREAT SERPL-MCNC: 0.73 MG/DL (ref 0.66–1.25)
DIFFERENTIAL METHOD BLD: NORMAL
EOSINOPHIL # BLD AUTO: 0.2 10E9/L (ref 0–0.7)
EOSINOPHIL NFR BLD AUTO: 4.3 %
ERYTHROCYTE [DISTWIDTH] IN BLOOD BY AUTOMATED COUNT: 13.3 % (ref 10–15)
GFR SERPL CREATININE-BSD FRML MDRD: >90 ML/MIN/1.7M2
GLUCOSE SERPL-MCNC: 124 MG/DL (ref 70–99)
HCT VFR BLD AUTO: 41.2 % (ref 40–53)
HGB BLD-MCNC: 14.1 G/DL (ref 13.3–17.7)
IMM GRANULOCYTES # BLD: 0 10E9/L (ref 0–0.4)
IMM GRANULOCYTES NFR BLD: 0.2 %
LYMPHOCYTES # BLD AUTO: 1.5 10E9/L (ref 0.8–5.3)
LYMPHOCYTES NFR BLD AUTO: 32.3 %
MCH RBC QN AUTO: 31.7 PG (ref 26.5–33)
MCHC RBC AUTO-ENTMCNC: 34.2 G/DL (ref 31.5–36.5)
MCV RBC AUTO: 93 FL (ref 78–100)
MONOCYTES # BLD AUTO: 0.5 10E9/L (ref 0–1.3)
MONOCYTES NFR BLD AUTO: 10.9 %
NEUTROPHILS # BLD AUTO: 2.4 10E9/L (ref 1.6–8.3)
NEUTROPHILS NFR BLD AUTO: 51.9 %
NRBC # BLD AUTO: 0 10*3/UL
NRBC BLD AUTO-RTO: 0 /100
PLATELET # BLD AUTO: 158 10E9/L (ref 150–450)
POTASSIUM SERPL-SCNC: 3.8 MMOL/L (ref 3.4–5.3)
PROT SERPL-MCNC: 6.9 G/DL (ref 6.8–8.8)
RBC # BLD AUTO: 4.45 10E12/L (ref 4.4–5.9)
SODIUM SERPL-SCNC: 136 MMOL/L (ref 133–144)
T4 FREE SERPL-MCNC: 0.76 NG/DL (ref 0.76–1.46)
TSH SERPL DL<=0.005 MIU/L-ACNC: 5.5 MU/L (ref 0.4–4)
WBC # BLD AUTO: 4.7 10E9/L (ref 4–11)

## 2017-12-05 PROCEDURE — 84443 ASSAY THYROID STIM HORMONE: CPT | Performed by: PHYSICIAN ASSISTANT

## 2017-12-05 PROCEDURE — 84439 ASSAY OF FREE THYROXINE: CPT | Performed by: PHYSICIAN ASSISTANT

## 2017-12-05 PROCEDURE — 36415 COLL VENOUS BLD VENIPUNCTURE: CPT

## 2017-12-05 PROCEDURE — 99214 OFFICE O/P EST MOD 30 MIN: CPT | Mod: GC | Performed by: INTERNAL MEDICINE

## 2017-12-05 PROCEDURE — 80053 COMPREHEN METABOLIC PANEL: CPT | Performed by: PHYSICIAN ASSISTANT

## 2017-12-05 PROCEDURE — 85025 COMPLETE CBC W/AUTO DIFF WBC: CPT | Performed by: PHYSICIAN ASSISTANT

## 2017-12-05 PROCEDURE — 99213 OFFICE O/P EST LOW 20 MIN: CPT | Mod: ZF

## 2017-12-05 RX ORDER — INFLUENZA A VIRUS A/VICTORIA/4897/2022 IVR-238 (H1N1) ANTIGEN (FORMALDEHYDE INACTIVATED), INFLUENZA A VIRUS A/CALIFORNIA/122/2022 SAN-022 (H3N2) ANTIGEN (FORMALDEHYDE INACTIVATED), AND INFLUENZA B VIRUS B/MICHIGAN/01/2021 ANTIGEN (FORMALDEHYDE INACTIVATED) 60; 60; 60 UG/.5ML; UG/.5ML; UG/.5ML
INJECTION, SUSPENSION INTRAMUSCULAR
Refills: 0 | COMMUNITY
Start: 2017-10-09 | End: 2018-09-05

## 2017-12-05 RX ORDER — PILOCARPINE HYDROCHLORIDE 5 MG/1
5 TABLET, FILM COATED ORAL 4 TIMES DAILY PRN
Qty: 360 TABLET | Refills: 3 | Status: SHIPPED | OUTPATIENT
Start: 2017-12-05 | End: 2018-09-05

## 2017-12-05 RX ORDER — DOCUSATE CALCIUM 240 MG
240 CAPSULE ORAL
COMMUNITY

## 2017-12-05 RX ORDER — FEXOFENADINE HCL 180 MG/1
TABLET ORAL
COMMUNITY
End: 2017-12-06

## 2017-12-05 ASSESSMENT — PAIN SCALES - GENERAL: PAINLEVEL: NO PAIN (0)

## 2017-12-05 NOTE — MR AVS SNAPSHOT
After Visit Summary   12/5/2017    Chema Morales    MRN: [id number]           Patient Information     Date Of Birth          1940        Visit Information        Provider Department      12/5/2017 3:00 PM Navarro Willingham DO [facility]        Today's Diagnoses     Squamous cell carcinoma of pyriform sinus (H)        Malignant neoplasm of pyriform sinus (H)           Follow-ups after your visit        Follow-up notes from your care team     Return in about 6 months (around 6/5/2018) for Physical Exam, Lab Work.      Your next 10 appointments already scheduled     Dec 06, 2017  8:20 AM CST   (Arrive by 8:05 AM)   RETURN TUMOR VISIT with Boone Wynne MD   [facility] Ear Nose and Throat ([facility])    [address] [place] [address]  4th [facility] [address]   [phone]            Dec 06, 2017  8:20 AM CST   Treatment with JING Warner   [facility] ([facility])    [address] [place] [address]  4th [facility] [address] [phone]            Dec 26, 2017 11:30 AM CST   Masonic Lab Draw with  MASONIC LAB DRAW   [facility] Masonic Lab Draw ([facility])    [address] [place] [id number] [phone]            Jun 12, 2018  2:30 PM CDT   Masonic Lab Draw with UC MASONIC LAB DRAW   [facility] Masonic Lab Draw ([facility])    [address] [place] [address]   [phone]            Jun 12, 2018  3:00 PM CDT   (Arrive by 2:45 PM)   Return Visit with DO HEIDY Jalloh [facility] ([facility])    [address] [place] [id number] [phone]              Future tests that were ordered for you today     Open Future Orders        Priority Expected Expires Ordered    Comprehensive metabolic panel Routine 6/5/2018 12/5/2018  "12/5/2017    *CBC with platelets differential Routine 6/5/2018 12/5/2018 12/5/2017    TSH Routine 6/5/2018 12/5/2018 12/5/2017            Who to contact     If you have questions or need follow up information about today's clinic visit or your schedule please contact [facility] directly at [phone].  Normal or non-critical lab and imaging results will be communicated to you by Owingohart, letter or phone within 4 business days after the clinic has received the results. If you do not hear from us within 7 days, please contact the clinic through StorPoolt or phone. If you have a critical or abnormal lab result, we will notify you by phone as soon as possible.  Submit refill requests through IDINCU or call your pharmacy and they will forward the refill request to us. Please allow 3 business days for your refill to be completed.          Additional Information About Your Visit        OwingoharmyEDmatch Information     IDINCU gives you secure access to your electronic health record. If you see a primary care provider, you can also send messages to your care team and make appointments. If you have questions, please call your primary care clinic.  If you do not have a primary care provider, please call [phone] and they will assist you.        Care EveryWhere ID     This is your Care EveryWhere ID. This could be used by other organizations to access your [place] medical records  [id number]        Your Vitals Were     Pulse Temperature Respirations Height Pulse Oximetry BMI (Body Mass Index)    72 98.2  F (36.8  C) (Oral) 16 1.651 m (5' 5\") 95% 24.15 kg/m2       Blood Pressure from Last 3 Encounters:   12/05/17 132/72   06/20/17 109/70   12/20/16 136/72    Weight from Last 3 Encounters:   12/05/17 65.8 kg (145 lb 1.6 oz)   06/20/17 64.1 kg (141 lb 4.8 oz)   12/21/16 63 kg (139 lb)              We Performed the Following     CBC with platelets differential     Comprehensive metabolic panel     T4 free     " TSH with free T4 reflex          Where to get your medicines      These medications were sent to [facility] [address] [facility] - [place], [place] - [address]  [address], [place] [address]     Phone:  [phone]     pilocarpine 5 MG tablet          Primary Care Provider Office Phone # Fax #    Luzmaria Frantz Morrissey [phone]       [address] [facility] [address]        Equal Access to Services     TRAVIS MCKNIGHT : Hadii aad ku hadasho Soomaali, waaxda luqadaha, qaybta kaalmada adeegyada, waxay johnin haykeyurn alejandro seay . So [facility] [phone].    ATENCIÓN: Si habla español, tiene a martin disposición servicios gratuitos de asistencia lingüística. [facility] [phone].    We comply with applicable federal civil rights laws and [place] laws. We do not discriminate on the basis of race, color, national origin, age, disability, sex, sexual orientation, or gender identity.            Thank you!     Thank you for choosing [facility]CER [facility]  for your care. Our goal is always to provide you with excellent care. Hearing back from our patients is one way we can continue to improve our services. Please take a few minutes to complete the written survey that you may receive in the mail after your visit with us. Thank you!             Your Updated Medication List - Protect others around you: Learn how to safely use, store and throw away your medicines at www.disposemymeds.org.          This list is accurate as of: 12/5/17 11:59 PM.  Always use your most recent med list.                   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), Malignant neoplasm of pyriform sinus (H)       fexofenadine 180 MG tablet    ALLEGRA      Squamous cell carcinoma of pyriform sinus (H), Malignant neoplasm of pyriform sinus (H)       FLUZONE HIGH-DOSE 0.5 ML injection   Generic  drug:  influenza Vac Split High-Dose      TO BE ADMINISTERED BY PHARMACIST FOR IMMUNIZATION    Squamous cell carcinoma of pyriform sinus (H), Malignant neoplasm of pyriform sinus (H)       methylPREDNISolone 32 MG tablet    MEDROL    2 tablet    Take one tablet at 9PM tonight 9-26, take a second tablet at 7AM on 9-27    Allergic state       Multi-vitamin Tabs tablet      Take 1 tablet by mouth daily        pilocarpine 5 MG tablet    SALAGEN    360 tablet    Take 1 tablet (5 mg) by mouth 4 times daily as needed    Squamous cell carcinoma of pyriform sinus (H), Malignant neoplasm of pyriform sinus (H)

## 2017-12-05 NOTE — NURSING NOTE
"Oncology Rooming Note    December 5, 2017 2:49 PM   Chema Morales is a 77 year old male who presents for:    Chief Complaint   Patient presents with     Blood Draw     Labs drawn from venipuncture by RN. Vs taken and pt checked in for appt     Oncology Clinic Visit     F/U Hypopharyngeal ca     Initial Vitals: /72 (BP Location: Right arm, Cuff Size: Adult Regular)  Pulse 72  Temp 98.2  F (36.8  C) (Oral)  Resp 16  Ht 1.651 m (5' 5\")  Wt 65.8 kg (145 lb 1.6 oz)  SpO2 95%  BMI 24.15 kg/m2 Estimated body mass index is 24.15 kg/(m^2) as calculated from the following:    Height as of this encounter: 1.651 m (5' 5\").    Weight as of this encounter: 65.8 kg (145 lb 1.6 oz). Body surface area is 1.74 meters squared.  No Pain (0) Comment: Data Unavailable   No LMP for male patient.  Allergies reviewed: Yes  Medications reviewed: Yes    Medications: MEDICATION REFILLS NEEDED TODAY. Provider was notified.( Pilocarpine refill needed)  Pharmacy name entered into [employer]:    [facility] [place], [place] - [address] [place] [address] [phone]  [facility] [phone] [place] [id number]    Clinical concerns: none Dr Willingham was NOT notified.    10 minutes for nursing intake (face to face time)     JHONY PAVON LPN            "

## 2017-12-05 NOTE — LETTER
12/5/2017       RE: Chema Morales  [address]  [facility] [address]     Dear Colleague,    Thank you for referring your patient, Chema Morales, to the [facility]. Please see a copy of my visit note below.    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  -At the beginning of 2015 he developed left-sided odynophagia. He saw Dr. Cailin Rivera in ENT in [place], who performed a flexible endoscopy which revealed a lesion in the left false vocal cord and postcricoid region.   - 03/18/15 diagnosed with SCC (keratinizing type with ulcerstion) by Left supraglottic bx   - There was submucosal edema and fullness with tumor at the left arytenoid and aryepiglottic fold extending into the left piriform sinus. There was an exophytic lesion visualized in the base of the piriform sinus and extending up to the lateral left postcricoid region.   - 03/24/2015 PET/CT showed 15 mm hypermetabolic mass in the left piriform sinus and 16 mm hypermetabolic left level IIA lymph node. No distant mets. There was nonspecific focal FDG uptake in the hilar regions without gross segundo formation, however evaluation on CT is partially limited given lack of IV contrast.   - Stage was NORAH ([id number])  - 4/15/15 started chemoradiation with HD Cisplatin. He was switched to weekly cisplatin with 40 mg /m2 on 5/27/15 for worsening hearing loss and tinnitus.   - 6/3/2015 completed therapy  - 8/27/15 PET scan showed a residual activity in the neck LN. Also there were multiple concerning hilar LN.   - 9/9/15 left modified radical neck dissection. Pathology negative for viable tumor  -scans in 6/2016 show no signs of recurrence on CT chest and CT  "neck.       INTERVAL HISTORY:      The patient is here for followup with his wife.  He reports doing well overall.  He denies any new lumps or bumps in his neck.  His swallowing has improved.  He has occasional intermittent hoarseness of his voice and recently developed some cold symptoms with productive cough.  He denies any chest pain, dyspnea, weight loss or changes in appetite.  He has been constipated a little bit.  Otherwise, he has no new problems or symptoms.  He is taking pilocarpine for dry mouth and that helps.  He is planning a trip to [facility] for several months.  He leaves on 12/24.  He will be back in April.  He has appt with ENT tomorrow.                   Review Of Systems  10-point review of systems were negative except as noted in HPI.        EXAM:  Blood pressure 132/72, pulse 72, temperature 98.2  F (36.8  C), temperature source Oral, resp. rate 16, height 1.651 m (5' 5\"), weight 65.8 kg (145 lb 1.6 oz), SpO2 95 %.  GEN: alert and oriented x 3, nad  HEENT: PERRLA, EOMI, sclera anicteric, oral mucosa moist without thrush  NECK: supple, no palpable LAD, post radiation fibrosis of the skin in the left neck.  CV: reg rate and rhythm, no murmurs  LUNGS: clear to auscultation bilaterally  ABD: soft, nt, nd, +bs x 4, No HSM  EXT: no clubbing, cyanosis, or edema  NEURO: CN 2-12 intact, MS 5/5 b/l    Current Outpatient Prescriptions   Medication Sig Dispense Refill     pilocarpine (SALAGEN) 5 MG tablet Take 1 tablet (5 mg) by mouth 4 times daily as needed 360 tablet 3     multivitamin, therapeutic with minerals (MULTI-VITAMIN) TABS Take 1 tablet by mouth daily       docusate calcium (SURFAK) 240 MG capsule Take 240 mg by mouth       fexofenadine (ALLEGRA) 180 MG tablet        FLUZONE HIGH-DOSE 0.5 ML injection TO BE ADMINISTERED BY PHARMACIST FOR IMMUNIZATION  0     methylPREDNISolone (MEDROL) 32 MG tablet Take one tablet at 9PM tonight 9-26, take a second tablet at 7AM on 9-27 (Patient not taking: Reported " on 12/5/2017) 2 tablet 0       CBC RESULTS:   Recent Labs   Lab Test  12/05/17   1433   WBC  4.7   RBC  4.45   HGB  14.1   HCT  41.2   MCV  93   MCH  31.7   MCHC  34.2   RDW  13.3   PLT  158     Recent Labs   Lab Test  12/05/17   1433  06/20/17   1412   NA  136  135   POTASSIUM  3.8  4.0   CHLORIDE  102  102   CO2  27  26   ANIONGAP  7  7   GLC  124*  113*   BUN  11  14   CR  0.73  0.80   CAROLIN  8.8  9.0         CT neck: 9/27/17    Impression:  No significant change since 12/20/2016. No evidence of recurrent or  metastatic squamous cell carcinoma.    CT chest:   9/27/17:  Impression:  No significant change since 12/20/2016. No evidence of recurrent or  metastatic squamous cell carcinoma.    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   No evidence of recurrence on the last imaging. He is seeing Dr. Wynne tomorrow.   He is feeling well.  Patient is  now 2.5 years out from completion of therapy.  He is going to [facility] on Dec 24th and will be back in late April.    He will come back in 6 months for a follow up visit.  We will do another scan in one year (due in Sep 2018).     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  Pilocarpine refilled, 3 mo supply given since he is travelling.       Subclinical hypothyroidism: TSH- mildly elevated, free T4 is WNL, patient clinically is asymptomatic, will continue to monitor. Re check in 6 months.       Patient seen and discussed with dr. Willingham.    Dimple Alexander MD  Hem-Onc Fellow    Pt was seen and examined with Dr. Alexander and I agree with her note.  Pt is doing well.  Will travel to [facility] on Xmas quintin and will return in April.  We will f/u with him upon his return in 6 months.      We spent 25 minutes with the patient. >50% was spent in counseling and coordination of care.   Navarro Willingham   of Medicine  Division of Hematology, Oncology, and Transplantation

## 2017-12-05 NOTE — NURSING NOTE
Chief Complaint   Patient presents with     Blood Draw     Labs drawn from venipuncture by RN. Vs taken and pt checked in for appt     Labs collected from venipuncture by RN. Vitals taken. Checked in for appointment(s).    Saira Campbell RN

## 2017-12-06 ENCOUNTER — OFFICE VISIT (OUTPATIENT)
Dept: OTOLARYNGOLOGY | Facility: CLINIC | Age: 77
End: 2017-12-06

## 2017-12-06 ENCOUNTER — THERAPY VISIT (OUTPATIENT)
Dept: SPEECH THERAPY | Facility: CLINIC | Age: 77
End: 2017-12-06

## 2017-12-06 VITALS — HEIGHT: 65 IN | WEIGHT: 143 LBS | BODY MASS INDEX: 23.82 KG/M2

## 2017-12-06 DIAGNOSIS — D49.0 PIRIFORM SINUS TUMOR: Primary | ICD-10-CM

## 2017-12-06 DIAGNOSIS — C13.9 SQUAMOUS CELL CANCER OF HYPOPHARYNX (H): Primary | ICD-10-CM

## 2017-12-06 DIAGNOSIS — R13.12 OROPHARYNGEAL DYSPHAGIA: ICD-10-CM

## 2017-12-06 ASSESSMENT — PAIN SCALES - GENERAL: PAINLEVEL: NO PAIN (0)

## 2017-12-06 NOTE — MR AVS SNAPSHOT
After Visit Summary   12/6/2017    Chema Morales    MRN: [id number]           Patient Information     Date Of Birth          1940        Visit Information        Provider Department      12/6/2017 8:20 AM Boone Wynne MD [facility] Ear Nose and Throat        Today's Diagnoses     Piriform sinus tumor    -  1       Follow-ups after your visit        Your next 10 appointments already scheduled     Dec 26, 2017 11:30 AM CST   [facility] Draw with  [facility]   [facility] Draw ([facility])    [address] [place] [id number]   [phone]            Jun 12, 2018  2:30 PM CDT   [facility] Lab Draw with  [facility] DRAW   [facility] Draw ([facility])    [address] [place] [address]   [phone]            Jun 12, 2018  3:00 PM CDT   (Arrive by 2:45 PM)   Return Visit with Navarro Willingham DO   [facility] ([facility])    [address] [place] [id number] [phone]              Future tests that were ordered for you today     Open Future Orders        Priority Expected Expires Ordered    Comprehensive metabolic panel Routine 6/5/2018 12/5/2018 12/5/2017    *CBC with platelets differential Routine 6/5/2018 12/5/2018 12/5/2017    TSH Routine 6/5/2018 12/5/2018 12/5/2017            Who to contact     Please call your clinic at [phone] to:    Ask questions about your health    Make or cancel appointments    Discuss your medicines    Learn about your test results    Speak to your doctor   If you have compliments or concerns about an experience at your clinic, or if you wish to file a complaint, please contact [facility] Patient Relations at [phone] or email us at [email or web address]         Additional Information About Your Visit        Marce  "Information     [employer] gives you secure access to your electronic health record. If you see a primary care provider, you can also send messages to your care team and make appointments. If you have questions, please call your primary care clinic.  If you do not have a primary care provider, please call [phone] and they will assist you.      [employer] is an electronic gateway that provides easy, online access to your medical records. With [employer], you can request a clinic appointment, read your test results, renew a prescription or communicate with your care team.     To access your existing account, please contact your [facility] or call [phone] for assistance.        Care EveryWhere ID     This is your Care EveryWhere ID. This could be used by other organizations to access your [facility] medical records  [id number]        Your Vitals Were     Height BMI (Body Mass Index)                1.651 m (5' 5\") 23.8 kg/m2           Blood Pressure from Last 3 Encounters:   12/05/17 132/72   06/20/17 109/70   12/20/16 136/72    Weight from Last 3 Encounters:   12/06/17 64.9 kg (143 lb)   12/05/17 65.8 kg (145 lb 1.6 oz)   06/20/17 64.1 kg (141 lb 4.8 oz)              We Performed the Following     LARYNGOSCOPY FLEX FIBEROPTIC, DIAGNOSTIC        Primary Care Provider Office Phone # Fax #    Luzmaria Frantz Morrissey [phone]       [address] [facility] [address]        Equal Access to Services     BECCA MCKNIGHT AH: Hadii aad ku hadasho Soomaali, waaxda luqadaha, qaybta kaalmada adeegyada, melvin seay . So [facility] [phone].    ATENCIÓN: Si habla español, tiene a martin disposición servicios gratuitos de asistencia lingüística. Llame al [phone].    We comply with applicable federal civil rights laws and [place] laws. We do not discriminate on the basis of race, color, national origin, age, disability, sex, sexual orientation, or " gender identity.            Thank you!     Thank you for choosing [facility] EAR NOSE AND THROAT  for your care. Our goal is always to provide you with excellent care. Hearing back from our patients is one way we can continue to improve our services. Please take a few minutes to complete the written survey that you may receive in the mail after your visit with us. Thank you!             Your Updated Medication List - Protect others around you: Learn how to safely use, store and throw away your medicines at www.disposemymeds.org.          This list is accurate as of: 12/6/17  9:12 AM.  Always use your most recent med list.                   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), Malignant neoplasm of pyriform sinus (H)       methylPREDNISolone 32 MG tablet    MEDROL    2 tablet    Take one tablet at 9PM tonight 9-26, take a second tablet at 7AM on 9-27    Allergic state       Multi-vitamin Tabs tablet      Take 1 tablet by mouth daily        pilocarpine 5 MG tablet    SALAGEN    360 tablet    Take 1 tablet (5 mg) by mouth 4 times daily as needed    Squamous cell carcinoma of pyriform sinus (H), Malignant neoplasm of pyriform sinus (H)

## 2017-12-06 NOTE — LETTER
12/6/2017       RE: Chema Morales  [address]  [facility] [address]     Dear Colleague,    Thank you for referring your patient, Chema Morales, to the [facility] EAR NOSE AND THROAT at [facility]. Please see a copy of my visit note below.    December 6, 2017      PRIOR HISTORY:  Mr. 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  He did quite well, but a residual node was noted at the three-month PET CT scan, and he underwent a neck dissection on 09/09/2015.  Pathology from the neck dissection on September 9 shows 1 out of 3 nodes being positive and that node containing nonviable tumor.      HISTORY OF PRESENT ILLNESS:    Mr. Morales returns for a visit at 27 months.  He had scans in September that represented his 2-year scans, and they were clear.    He reports that he is doing very well.  He denies any difficulty with swallowing.  In fact he says that his swallowing is improving now.  He has had no bleeding or weight loss.  He feels excellent, and he and his wife are preparing to leave for [facility] in a few more weeks.    PHYSICAL EXAMINATION:    He is with his son and his wife.  The oral cavity and oropharynx look great.  There are no mucosal lesions.  Everything is very mobile.  I felt the base of tongue and tonsillar area, but I could not feel any further back due to a gag.  The neck on the left side continues to feel very indurated, especially along the anterior border of the left SCM, where the sternal head is very scarred.  The right side feels much more normal.  We talked extensively about massaging the area of scar.      FIBEROPTIC ENDOSCOPY:  Due to the need for careful surveillance, fiberoptic laryngoscopy was indicated. The nose was topically decongested and anesthetized. The fiberoptic laryngoscope was  passed on the left under endoscopic vision. The turbinates were normal.  The inferior and middle meati were clear bilaterally without purulence, masses, or polyps.  The nasopharynx was clear.  The eustachian tubes were clear. The soft palate appeared normal with good mobility. The epiglottis and cords are very sharp.  The difference this time is that both piriform sinuses are widely open.  I could pass a scope into both piriform sinuses and there are clear.  Obviously, I could not reach the apex of the piriform sinuses, but the base was clear.      IMPRESSION AND PLAN:  Mr. Morales is ENRIQUE by examination at 27 months, with negative 24 month scans.  We will see him 6 months once he returns from [facility] in the spring.  We will get his three-year scans in September.  I will introduce him to [facility] today since Anita is no longer here.      Boone Wynne MD  Otolaryngology/Head & Neck Surgery  [phone]    CC  Delfina Rhodes MD    Otolaryngology Clinic   [facility] 396             Pawan Morrissey MD   [address], [place] [address]      Joanie Yeboah MD   [facility] 400

## 2017-12-06 NOTE — NURSING NOTE
"Chief Complaint   Patient presents with     RECHECK     Follow up carcinoma of the sinus      Height 1.651 m (5' 5\"), weight 64.9 kg (143 lb).    Golden Krueger    "

## 2017-12-06 NOTE — PROGRESS NOTES
December 6, 2017      PRIOR HISTORY:  Mr. 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  He did quite well, but a residual node was noted at the three-month PET CT scan, and he underwent a neck dissection on 09/09/2015.  Pathology from the neck dissection on September 9 shows 1 out of 3 nodes being positive and that node containing nonviable tumor.      HISTORY OF PRESENT ILLNESS:    Mr. Morales returns for a visit at 27 months.  He had scans in September that represented his 2-year scans, and they were clear.    He reports that he is doing very well.  He denies any difficulty with swallowing.  In fact he says that his swallowing is improving now.  He has had no bleeding or weight loss.  He feels excellent, and he and his wife are preparing to leave for [facility] in a few more weeks.    PHYSICAL EXAMINATION:    He is with his son and his wife.  The oral cavity and oropharynx look great.  There are no mucosal lesions.  Everything is very mobile.  I felt the base of tongue and tonsillar area, but I could not feel any further back due to a gag.  The neck on the left side continues to feel very indurated, especially along the anterior border of the left SCM, where the sternal head is very scarred.  The right side feels much more normal.  We talked extensively about massaging the area of scar.      FIBEROPTIC ENDOSCOPY:  Due to the need for careful surveillance, fiberoptic laryngoscopy was indicated. The nose was topically decongested and anesthetized. The fiberoptic laryngoscope was passed on the left under endoscopic vision. The turbinates were normal.  The inferior and middle meati were clear bilaterally without purulence, masses, or polyps.  The nasopharynx was clear.  The eustachian tubes were clear. The soft palate appeared normal with good mobility. The epiglottis and  cords are very sharp.  The difference this time is that both piriform sinuses are widely open.  I could pass a scope into both piriform sinuses and there are clear.  Obviously, I could not reach the apex of the piriform sinuses, but the base was clear.      IMPRESSION AND PLAN:  Mr. Morales is ENRIQUE by examination at 27 months, with negative 24 month scans.  We will see him 6 months once he returns from [facility] in the spring.  We will get his three-year scans in September.  I will introduce him to Karen today since Anita is no longer here.      Boone Wynne MD  Otolaryngology/Head & Neck Surgery  [phone]               CC  Delfina Rhodes MD    Otolaryngology Clinic   [facility] 396             Pawan Morrissey MD   [address], [place] [address]      Joanie Yeboah MD   [facility] 400

## 2018-06-06 ENCOUNTER — OFFICE VISIT (OUTPATIENT)
Dept: OTOLARYNGOLOGY | Facility: CLINIC | Age: 78
End: 2018-06-06
Payer: COMMERCIAL

## 2018-06-06 VITALS
WEIGHT: 146 LBS | SYSTOLIC BLOOD PRESSURE: 119 MMHG | BODY MASS INDEX: 24.32 KG/M2 | HEIGHT: 65 IN | DIASTOLIC BLOOD PRESSURE: 76 MMHG

## 2018-06-06 DIAGNOSIS — Z91.041 CONTRAST MEDIA ALLERGY: ICD-10-CM

## 2018-06-06 DIAGNOSIS — C13.9 SQUAMOUS CELL CANCER OF HYPOPHARYNX (H): Primary | ICD-10-CM

## 2018-06-06 RX ORDER — METHYLPREDNISOLONE 32 MG/1
TABLET ORAL
Qty: 2 TABLET | Refills: 0 | Status: SHIPPED | OUTPATIENT
Start: 2018-06-06 | End: 2019-05-15

## 2018-06-06 RX ORDER — POLYETHYLENE GLYCOL 3350 17 G/17G
17 POWDER, FOR SOLUTION ORAL
COMMUNITY
Start: 2018-05-30

## 2018-06-06 ASSESSMENT — PAIN SCALES - GENERAL: PAINLEVEL: MODERATE PAIN (5)

## 2018-06-06 NOTE — LETTER
6/6/2018       RE: Chema Morales  [address]  [facility] [address]     Dear Colleague,    Thank you for referring your patient, Chema Morales, to the [facility] THROAT at [facility]. Please see a copy of my visit note below.    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 Patient did quite well but a residual node was noted at the 3-month PET/CT scan so he underwent a left neck dissection 9/9/2015. Pathology from the neck dissection on September 9th showed 1 out of 3 nodes being positive and that one node containing nonviable tumor.    HISTORY OF PRESENT ILLNESS:  Mr. Morales has returned for follow-up oncologic surveillance. He has just returned from [facility] with his wife a few weeks ago. He is now 2 years and 9 months out from his neck dissection. Last scans 9/27/17 were clear. Patient is doing very well today. He is very bright and cheery and comes with his son and wife. He continues to have occasional hoarseness and cough. These have been ongoing symptoms since his chemoradiation treatment and they have not changed since we last saw him. He reports that he uses Mucinex and lozenges occasionally for the cough which does help expel the mucous. He denies any bleeding, unexpected weight loss, dysphagia, hemoptysis, new lumps or bumps, and pain.     ALLERGIES:  Allergies   Allergen Reactions     Ampicillin Nausea     Contrast Dye Itching     Pt had itching on his tongue and cheek following injection of Isovue 370 (CT contrast)     Diagnostic X-Ray Materials Itching     Pt had itching on his tongue and cheek following injection of Isovue 370 (CT contrast)     No Clinical Screening - See Comments Itching     bandaids      "Neomycin Rash     Neosporin [Neomycin-Polymyx-Gramicid] Itching     Tape [Adhesive Tape] Rash     Cotton and paper tape ok      MEDICATIONS:  Current Outpatient Prescriptions   Medication     docusate calcium (SURFAK) 240 MG capsule     methylPREDNISolone (MEDROL) 32 MG tablet     multivitamin, therapeutic with minerals (MULTI-VITAMIN) TABS     pilocarpine (SALAGEN) 5 MG tablet     polyethylene glycol (MIRALAX/GLYCOLAX) Packet     FLUZONE HIGH-DOSE 0.5 ML injection     methylPREDNISolone (MEDROL) 32 MG tablet     No current facility-administered medications for this visit.      PAST MEDICAL HISTORY:  Past Medical History:   Diagnosis Date     GERD (gastroesophageal reflux disease)      High cholesterol      History of radiation therapy      Hoarseness July 2015    after radiation     Larynx cancer (H)     scca       PHYSICAL EXAMINATION:    /76  Ht 1.651 m (5' 5\")  Wt 66.2 kg (146 lb)  BMI 24.3 kg/m2  Constitutional:  The patient was accompanied by his wife and son, well-groomed, and in no acute distress.     Skin: Normal:  warm and pink without rash   Neurologic: Alert and oriented.   Psychiatric: The patient's affect was calm, cooperative, and appropriate.     Communication:  Hoarse. Patient clearing his throat often.    Respiratory: Breathing comfortably without stridor or exertion of accessory muscles.    Head/Face:  Normocephalic and atraumatic.  No lesions or scars.   Oral Cavity: Normal tongue, floor of mouth, buccal mucosa, and palate.  No lesions or masses on inspection or palpation.     Oropharynx: Normal mucosa, palate symmetric with normal elevation. No abnormal lymph tissue in the oropharynx. Palpation revealed no masses or induration.   Neck: Right side is supple with normal laryngeal and tracheal landmarks.  Left side is woody secondary to radiation changes especially along the anterior border of the SCM.    Lymphatic: There is no palpable lymphadenopathy in the neck.        PROCEDURE: "   FIBEROPTIC ENDOSCOPY:  Consent for fiberoptic laryngoscopy was obtained, and we confirmed correctness of procedure and identity of patient. Fiberoptic laryngoscopy was indicated due to prior history of left piriform sinus cancer. The nose was topically decongested and anesthetized. The fiberoptic laryngoscope was passed under endoscopic vision through the left nare. The turbinates were normal. The inferior and middle meati were clear bilaterally without purulence, masses, or polyps. The nasopharynx was clear. The Eustachian tubes were clear. The soft palate appeared normal with good mobility. The epiglottis was sharp and the visualized portion of the vallecula was clear. Laryngeal surface of the epiglottis had a red hematoma midline.The larynx was clear with mobile cords. The arytenoids were clear and there was no pooling in the hypopharynx. Both piriform sinuses were not very open today and it was difficult to visualize them. However, what I was able to see was clear.    ASSESSMENT AND PLAN:  Chema Morales is a 78 year old male with a history of SCC of the left piriform sinus 2 years and 9 months s/p initial chemoradiation therapy and then left neck dissection. Patient is doing very well today and has ENRIQUE. We will follow-up with him in 3 months and obtain his 3 year scans at that time as well.     Patient was also evaluated by Dr. Wynne today.      Kay Ortiz PA-C  Otolaryngology - Head & Neck Surgery  [phone]      CC:  Boone Wynne MD  Otolaryngology/Head & Neck Surgery  [facility] 396    Delfina Rhodes MD    Otolaryngology Clinic   [facility] 396       Pawan Morrissey MD   [address], [place] [address]       Joanie Yeboah MD   [facility] 400

## 2018-06-11 ASSESSMENT — ENCOUNTER SYMPTOMS
NECK MASS: 0
ABDOMINAL PAIN: 0
DIFFICULTY URINATING: 0
SINUS CONGESTION: 0
RECTAL PAIN: 0
PANIC: 0
JAUNDICE: 0
CONSTIPATION: 1
VOMITING: 0
HEARTBURN: 1
NAUSEA: 0
DIARRHEA: 0
TROUBLE SWALLOWING: 0
TASTE DISTURBANCE: 1
BLOATING: 1
SORE THROAT: 0
INSOMNIA: 0
DEPRESSION: 0
DECREASED CONCENTRATION: 1
SMELL DISTURBANCE: 1
BLOOD IN STOOL: 0
FLANK PAIN: 0
HOARSE VOICE: 1
BOWEL INCONTINENCE: 0
NERVOUS/ANXIOUS: 0
HEMATURIA: 0
DYSURIA: 0
SINUS PAIN: 0

## 2018-06-12 ENCOUNTER — ONCOLOGY VISIT (OUTPATIENT)
Dept: ONCOLOGY | Facility: CLINIC | Age: 78
End: 2018-06-12
Attending: INTERNAL MEDICINE
Payer: COMMERCIAL

## 2018-06-12 ENCOUNTER — APPOINTMENT (OUTPATIENT)
Dept: LAB | Facility: CLINIC | Age: 78
End: 2018-06-12
Attending: INTERNAL MEDICINE
Payer: COMMERCIAL

## 2018-06-12 VITALS
OXYGEN SATURATION: 96 % | HEART RATE: 76 BPM | WEIGHT: 146.7 LBS | RESPIRATION RATE: 16 BRPM | SYSTOLIC BLOOD PRESSURE: 112 MMHG | DIASTOLIC BLOOD PRESSURE: 71 MMHG | BODY MASS INDEX: 24.44 KG/M2 | TEMPERATURE: 97.4 F | HEIGHT: 65 IN

## 2018-06-12 DIAGNOSIS — C12 MALIGNANT NEOPLASM OF PYRIFORM SINUS (H): ICD-10-CM

## 2018-06-12 DIAGNOSIS — C12 SQUAMOUS CELL CARCINOMA OF PYRIFORM SINUS (H): ICD-10-CM

## 2018-06-12 LAB
ALBUMIN SERPL-MCNC: 3.8 G/DL (ref 3.4–5)
ALP SERPL-CCNC: 51 U/L (ref 40–150)
ALT SERPL W P-5'-P-CCNC: 21 U/L (ref 0–70)
ANION GAP SERPL CALCULATED.3IONS-SCNC: 7 MMOL/L (ref 3–14)
AST SERPL W P-5'-P-CCNC: 19 U/L (ref 0–45)
BASOPHILS # BLD AUTO: 0 10E9/L (ref 0–0.2)
BASOPHILS NFR BLD AUTO: 0.4 %
BILIRUB SERPL-MCNC: 0.4 MG/DL (ref 0.2–1.3)
BUN SERPL-MCNC: 11 MG/DL (ref 7–30)
CALCIUM SERPL-MCNC: 8.8 MG/DL (ref 8.5–10.1)
CHLORIDE SERPL-SCNC: 102 MMOL/L (ref 94–109)
CO2 SERPL-SCNC: 26 MMOL/L (ref 20–32)
CREAT SERPL-MCNC: 0.75 MG/DL (ref 0.66–1.25)
DIFFERENTIAL METHOD BLD: NORMAL
EOSINOPHIL # BLD AUTO: 0.2 10E9/L (ref 0–0.7)
EOSINOPHIL NFR BLD AUTO: 4.4 %
ERYTHROCYTE [DISTWIDTH] IN BLOOD BY AUTOMATED COUNT: 13 % (ref 10–15)
GFR SERPL CREATININE-BSD FRML MDRD: >90 ML/MIN/1.7M2
GLUCOSE SERPL-MCNC: 104 MG/DL (ref 70–99)
HCT VFR BLD AUTO: 43.1 % (ref 40–53)
HGB BLD-MCNC: 15.1 G/DL (ref 13.3–17.7)
IMM GRANULOCYTES # BLD: 0 10E9/L (ref 0–0.4)
IMM GRANULOCYTES NFR BLD: 0.2 %
LYMPHOCYTES # BLD AUTO: 1.5 10E9/L (ref 0.8–5.3)
LYMPHOCYTES NFR BLD AUTO: 31.8 %
MCH RBC QN AUTO: 31.7 PG (ref 26.5–33)
MCHC RBC AUTO-ENTMCNC: 35 G/DL (ref 31.5–36.5)
MCV RBC AUTO: 90 FL (ref 78–100)
MONOCYTES # BLD AUTO: 0.4 10E9/L (ref 0–1.3)
MONOCYTES NFR BLD AUTO: 8.7 %
NEUTROPHILS # BLD AUTO: 2.6 10E9/L (ref 1.6–8.3)
NEUTROPHILS NFR BLD AUTO: 54.5 %
NRBC # BLD AUTO: 0 10*3/UL
NRBC BLD AUTO-RTO: 0 /100
PLATELET # BLD AUTO: 183 10E9/L (ref 150–450)
POTASSIUM SERPL-SCNC: 3.9 MMOL/L (ref 3.4–5.3)
PROT SERPL-MCNC: 7.2 G/DL (ref 6.8–8.8)
RBC # BLD AUTO: 4.77 10E12/L (ref 4.4–5.9)
SODIUM SERPL-SCNC: 136 MMOL/L (ref 133–144)
TSH SERPL DL<=0.005 MIU/L-ACNC: 6.38 MU/L (ref 0.4–4)
WBC # BLD AUTO: 4.7 10E9/L (ref 4–11)

## 2018-06-12 PROCEDURE — 36415 COLL VENOUS BLD VENIPUNCTURE: CPT

## 2018-06-12 PROCEDURE — 84443 ASSAY THYROID STIM HORMONE: CPT | Performed by: INTERNAL MEDICINE

## 2018-06-12 PROCEDURE — 80053 COMPREHEN METABOLIC PANEL: CPT | Performed by: INTERNAL MEDICINE

## 2018-06-12 PROCEDURE — 99214 OFFICE O/P EST MOD 30 MIN: CPT | Mod: ZP | Performed by: INTERNAL MEDICINE

## 2018-06-12 PROCEDURE — G0463 HOSPITAL OUTPT CLINIC VISIT: HCPCS | Mod: ZF

## 2018-06-12 PROCEDURE — 85025 COMPLETE CBC W/AUTO DIFF WBC: CPT | Performed by: INTERNAL MEDICINE

## 2018-06-12 ASSESSMENT — PAIN SCALES - GENERAL: PAINLEVEL: NO PAIN (0)

## 2018-06-12 NOTE — PROGRESS NOTES
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  -At the beginning of 2015 he developed left-sided odynophagia. He saw Dr. Cailin Rivera in ENT in [place], who performed a flexible endoscopy which revealed a lesion in the left false vocal cord and postcricoid region.   - 03/18/15 diagnosed with SCC (keratinizing type with ulcerstion) by Left supraglottic bx   - There was submucosal edema and fullness with tumor at the left arytenoid and aryepiglottic fold extending into the left piriform sinus. There was an exophytic lesion visualized in the base of the piriform sinus and extending up to the lateral left postcricoid region.   - 03/24/2015 PET/CT showed 15 mm hypermetabolic mass in the left piriform sinus and 16 mm hypermetabolic left level IIA lymph node. No distant mets. There was nonspecific focal FDG uptake in the hilar regions without gross segundo formation, however evaluation on CT is partially limited given lack of IV contrast.   - Stage was NORAH ([id number])  - 4/15/15 started chemoradiation with HD Cisplatin. He was switched to weekly cisplatin with 40 mg /m2 on 5/27/15 for worsening hearing loss and tinnitus.   - 6/3/2015 completed therapy  - 8/27/15 PET scan showed a residual activity in the neck LN. Also there were multiple concerning hilar LN.   - 9/9/15 left modified radical neck dissection. Pathology negative for viable tumor  -scans in 6/2016 show no signs of recurrence on CT chest and CT neck.     Chema returns in follow up. He is doing well. He returned from [facility] in May.  He had a great time there and had no significant health problems while there.  Today he is without substantial new complaints. He continues to have dry mouth  but is using pilocarpine which helps.  He otherwise has some hoarseness in his throat which happens mostly if he talks for a while.  This is not new and has been happening since his XRt.  He is eating and drinking well and his weight is stable. He otherwise has no complaints of shortness of breath or other new symptoms.     Review Of Systems  10-point review of systems were negative except as noted in HPI.        EXAM:  There were no vitals taken for this visit.  GEN: alert and oriented x 3, nad  HEENT: perrla, eomi, sclera anicteric, oral mucosa moist without thrush  NECK: supple, no palpable LAD, slighlty limited in ROM  HT: reg rate and rhythm, no murmurs  LUNGS: clear to auscultation bilaterally  ABD: soft, nt, nd, +bs x 4  EXT: no clubbing, cyanosis, or edema  NEURO: CN 2-12 intact, MS 5/5 b/l    Current Outpatient Prescriptions   Medication Sig Dispense Refill     docusate calcium (SURFAK) 240 MG capsule Take 240 mg by mouth       FLUZONE HIGH-DOSE 0.5 ML injection TO BE ADMINISTERED BY PHARMACIST FOR IMMUNIZATION  0     methylPREDNISolone (MEDROL) 32 MG tablet 1 tablet PO 12 hours before CT and 1 tablet PO 2 hours before CT 2 tablet 0     methylPREDNISolone (MEDROL) 32 MG tablet Take one tablet at 9PM tonight 9-26, take a second tablet at 7AM on 9-27 (Patient not taking: Reported on 6/6/2018) 2 tablet 0     multivitamin, therapeutic with minerals (MULTI-VITAMIN) TABS Take 1 tablet by mouth daily       pilocarpine (SALAGEN) 5 MG tablet Take 1 tablet (5 mg) by mouth 4 times daily as needed 360 tablet 3     polyethylene glycol (MIRALAX/GLYCOLAX) Packet Take 17 g by mouth             Recent Labs   Lab Test  12/05/17   1433   WBC  4.7   HGB  14.1   PLT  158     @labrcent[na,potassium,chloride,co2,bun,cr@  Recent Labs   Lab Test  12/05/17   1433   PROTTOTAL  6.9   ALBUMIN  3.7   BILITOTAL  0.5   AST  15   ALT  18   ALKPHOS  52         Results for orders placed or performed in visit on 09/27/17   CT Soft tissue neck  w contrast*    Narrative    CT SOFT TISSUE NECK W CONTRAST 9/27/2017 9:29 AM    History:  Patient is diagnosed with SCC 03/18/15, 03/24/2015 PET/CT  showed 15 mm hypermetabolic mass in the left piriform sinus, status  post chemoradiation and modified radial neck dissection.       Comparison:  CT neck 12/20/2016, 8/27/2015     Technique: Following intravenous administration of nonionic iodinated  contrast medium, thin section helical CT images were obtained from the  skull base down to the level of the aortic arch.  Axial, coronal and  sagittal reformations were performed with 3 mm slice thickness  reconstruction. Images were reviewed in soft tissue, lung and bone  windows.    Contrast: Isovue 370 69cc.    Findings:   Postsurgical changes of left neck dissection and prior radiation  therapy with asymmetric left loss of the normal deep fascial planes,  resection of the left submandibular gland, ligation of the left  internal jugular vein, supraglottic laryngeal edema and atrophy of the  thyroid gland. No mucosal space mass or cervical lymphadenopathy.    Parotid glands are unremarkable. Patent major cervical arterial  vasculature. No destructive bone lesion. Multilevel cervical  spondylosis. Polypoid mucosal thickening in the maxillary sinuses.  Bilateral pseudophakia. Visualized intracranial contents are  unremarkable.    Biapical pleural fibrosis. No visualized suspicious pulmonary nodule.      Impression    Impression:  No significant change since 12/20/2016. No evidence of recurrent or  metastatic squamous cell carcinoma.    I have personally reviewed the examination and initial interpretation  and I agree with the findings.    HILARIA ALMEIDA MD           Assessment/Plan  Locally advanced hypopharyngeal sq cell ca - He is now about 3 years out from completion of therapy.  He is ENRIQUE. He had a neck dissection in 9/2015 and will have restaging scans at that time.    Thyroid fxn - Tsh is slightly up. T4 is pending.  We will repeat in September. I think it is inevitable that he will need synthroid replacement, but he would like to wait which is reasonable. He is asymptomatic.     Constipation - this is long-standing and not clear why.  Could be related to hernia, but not sure if that is true.  Will make a referral to GI.     I spent 35 minutes with the patient.  >50% of the time was spent in counseling and coordination of care.    Navarro Willingham   of Medicine  Division of Hematology, Oncology, and Transplantation

## 2018-06-12 NOTE — NURSING NOTE
"Oncology Rooming Note    June 12, 2018 2:55 PM   Chema Morales is a 78 year old male who presents for:    Chief Complaint   Patient presents with     Blood Draw     Labs drawn via  by RN, BAKARI taken     Oncology Clinic Visit     Return visit related to Hypopharyngeal Cancer     Initial Vitals: /71 (BP Location: Left arm, Patient Position: Sitting, Cuff Size: Adult Regular)  Pulse 76  Temp 97.4  F (36.3  C) (Oral)  Resp 16  Ht 1.651 m (5' 5\")  Wt 66.5 kg (146 lb 11.2 oz)  SpO2 96%  BMI 24.41 kg/m2 Estimated body mass index is 24.41 kg/(m^2) as calculated from the following:    Height as of this encounter: 1.651 m (5' 5\").    Weight as of this encounter: 66.5 kg (146 lb 11.2 oz). Body surface area is 1.75 meters squared.  No Pain (0) Comment: Data Unavailable   No LMP for male patient.  Allergies reviewed: Yes  Medications reviewed: Yes    Medications: Medication refills not needed today.  Pharmacy name entered into [facility]:    [facility] [place], [place] - [address] [place] [address] [phone]  [facility] [id number] [place] [address]    Clinical concerns: No new concerns. Provider was notified.    10 minutes for nursing intake (face to face time)     Sophie Silver LPN            "

## 2018-06-12 NOTE — MR AVS SNAPSHOT
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 Also bring a list of your medicines, including vitamins, minerals and over-the-counter drugs. It is safest to leave personal items at home.  Be sure to tell your doctor:   If you have any allergies.   If there s any chance you are pregnant.   If you are breastfeeding.    If you have diabetes as your medication may need to be adjusted for this exam.  You will have contrast for this exam. To prepare:   Do not eat or drink for 2 hours before your exam. If you need to take medicine, you may take it with small sips of water. (We may ask you to take liquid medicine as well.)   The day before your exam, drink extra fluids at least six 8-ounce glasses (unless your doctor tells you to restrict your fluids).  Patients over 70 or patients with diabetes or kidney problems:   If you haven t had a blood test (creatinine test) within the last 30 days, the Cardiologist/Radiologist may require you to get this test prior to your exam.  Please wear loose clothing, such as a sweat suit or jogging clothes. Avoid snaps, zippers and other metal. We may ask you to undress and put on a hospital gown.  If you have any questions, please call the Imaging  Department where you will have your exam.            Sep 05, 2018  8:40 AM CDT   CT SOFT TISSUE NECK W CONTRAST with UCCT1   [facility] [place] ([facility] [place])    [address]  1st Floor  [facility] [id number] [phone]           Please bring any scans or X-rays taken at other hospitals, if similar tests were done. Also bring a list of your medicines, including vitamins, minerals and over-the-counter drugs. It is safest to leave personal items at home.  Be sure to tell your doctor:   If you have any allergies.   If there s any chance you are pregnant.   If you are breastfeeding.    If you have diabetes as your medication may need to be adjusted for this exam.  You will have contrast for this exam. To prepare:   Do not eat or drink for 2 hours before your exam. If you need to take medicine, you may take it with small sips of water. (We may ask you to take liquid medicine as well.)   The day before your exam, drink extra fluids at least six 8-ounce glasses (unless your doctor tells you to restrict your fluids).  Patients over 70 or patients with diabetes or kidney problems:   If you haven t had a blood test (creatinine test) within the last 30 days, the Cardiologist/Radiologist may require you to get this test prior to your exam.  Please wear loose clothing, such as a sweat suit or jogging clothes. Avoid snaps, zippers and other metal. We may ask you to undress and put on a hospital gown.  If you have any questions, please call the Imaging Department where you will have your exam.            Sep 05, 2018  9:30 AM CDT   (Arrive by 9:15 AM)   Return Visit with Kay Ortiz PA-C   [facility] Ear Nose and Throat ([facility])    [address]  4th Floor  [facility] [id number] [phone]            Sep 05, 2018 10:15 AM CDT   [facility] Lab Draw with  MAS[facility] LAB DRAW   [facility] Lab Draw ([facility]  "Center)    [address]  [address]  [facility] [id number] [phone]            Sep 11, 2018  4:00 PM CDT   (Arrive by 3:45 PM)   Return Visit with Navarro Willingham DO   [facility] ([facility])    [address]  [address]  [facility] [place] [phone] [phone]              Who to contact     If you have questions or need follow up information about today's clinic visit or your schedule please contact [facility]CER [facility] directly at [phone].  Normal or non-critical lab and imaging results will be communicated to you by [employer], letter or phone within 4 business days after the clinic has received the results. If you do not hear from us within 7 days, please contact the clinic through CarePaymentt or phone. If you have a critical or abnormal lab result, we will notify you by phone as soon as possible.  Submit refill requests through [employer] or call your pharmacy and they will forward the refill request to us. Please allow 3 business days for your refill to be completed.          Additional Information About Your Visit        [employer] Information     [employer] gives you secure access to your electronic health record. If you see a primary care provider, you can also send messages to your care team and make appointments. If you have questions, please call your primary care clinic.  If you do not have a primary care provider, please call [phone] and they will assist you.        Care EveryWhere ID     This is your Care EveryWhere ID. This could be used by other organizations to access your [facility] medical records  [id number]        Your Vitals Were     Pulse Temperature Respirations Height Pulse Oximetry BMI (Body Mass Index)    76 97.4  F (36.3  C) (Oral) 16 1.651 m (5' 5\") 96% 24.41 kg/m2       Blood Pressure from Last 3 Encounters:   06/12/18 112/71   06/06/18 119/76   12/05/17 132/72    Weight from Last 3 Encounters:   06/12/18 " 66.5 kg (146 lb 11.2 oz)   06/06/18 66.2 kg (146 lb)   12/06/17 64.9 kg (143 lb)              We Performed the Following     *CBC with platelets differential     Comprehensive metabolic panel     TSH        Primary Care Provider    Luzmaria Morrissey MD       No address on file        Equal Access to Services     TRAVIS MCKNIGHT : Hadii aad ku haddonyo Soernaali, waaxda luqadaha, qaybta kaalmada claudia, melvin richardsonstefanojina seay . So [facility] [phone].    ATENCIÓN: Si habla español, tiene a martin disposición servicios gratuitos de asistencia lingüística. Ll[facility] [phone].    We comply with applicable federal civil rights laws and [place] laws. We do not discriminate on the basis of race, color, national origin, age, disability, sex, sexual orientation, or gender identity.            Thank you!     Thank you for choosing [facility] CANCER CLINIC  for your care. Our goal is always to provide you with excellent care. Hearing back from our patients is one way we can continue to improve our services. Please take a few minutes to complete the written survey that you may receive in the mail after your visit with us. Thank you!             Your Updated Medication List - Protect others around you: Learn how to safely use, store and throw away your medicines at www.disposemymeds.org.          This list is accurate as of 6/12/18 11:59 PM.  Always use your most recent med list.                   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), Malignant neoplasm of pyriform sinus (H)       * methylPREDNISolone 32 MG tablet    MEDROL    2 tablet     Take one tablet at 9PM tonight 9-26, take a second tablet at 7AM on 9-27    Allergic state       * methylPREDNISolone 32 MG tablet    MEDROL    2 tablet    1 tablet PO 12 hours before CT and 1 tablet PO 2 hours before CT    Contrast media allergy       Multi-vitamin Tabs tablet      Take 1 tablet by mouth daily        pilocarpine 5 MG tablet    SALAGEN    360 tablet    Take 1 tablet (5 mg) by mouth 4 times daily as needed    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 Read the directions carefully, and ask your doctor or other care provider to review them with you.

## 2018-06-12 NOTE — LETTER
6/12/2018       RE: Chema Morales  [address]  [facility] [address]     Dear Colleague,    Thank you for referring your patient, Chmea Morales, to the [facility]. Please see a copy of my visit note below.    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  -At the beginning of 2015 he developed left-sided odynophagia. He saw Dr. Cailin Rivera in ENT in [place], who performed a flexible endoscopy which revealed a lesion in the left false vocal cord and postcricoid region.   - 03/18/15 diagnosed with SCC (keratinizing type with ulcerstion) by Left supraglottic bx   - There was submucosal edema and fullness with tumor at the left arytenoid and aryepiglottic fold extending into the left piriform sinus. There was an exophytic lesion visualized in the base of the piriform sinus and extending up to the lateral left postcricoid region.   - 03/24/2015 PET/CT showed 15 mm hypermetabolic mass in the left piriform sinus and 16 mm hypermetabolic left level IIA lymph node. No distant mets. There was nonspecific focal FDG uptake in the hilar regions without gross segundo formation, however evaluation on CT is partially limited given lack of IV contrast.   - Stage was NORAH ([id number])  - 4/15/15 started chemoradiation with HD Cisplatin. He was switched to weekly cisplatin with 40 mg /m2 on 5/27/15 for worsening hearing loss and tinnitus.   - 6/3/2015 completed therapy  - 8/27/15 PET scan showed a residual activity in the neck LN. Also there were multiple concerning hilar LN.   - 9/9/15 left modified radical neck dissection. Pathology negative for viable tumor  -scans in 6/2016 show no signs of recurrence on CT chest and CT  neck.     Chema returns in follow up. He is doing well. He returned from [facility] in May.  He had a great time there and had no significant health problems while there.  Today he is without substantial new complaints. He continues to have dry mouth but is using pilocarpine which helps.  He otherwise has some hoarseness in his throat which happens mostly if he talks for a while.  This is not new and has been happening since his XRt.  He is eating and drinking well and his weight is stable. He otherwise has no complaints of shortness of breath or other new symptoms.     Review Of Systems  10-point review of systems were negative except as noted in HPI.        EXAM:  There were no vitals taken for this visit.  GEN: alert and oriented x 3, nad  HEENT: perrla, eomi, sclera anicteric, oral mucosa moist without thrush  NECK: supple, no palpable LAD, slighlty limited in ROM  HT: reg rate and rhythm, no murmurs  LUNGS: clear to auscultation bilaterally  ABD: soft, nt, nd, +bs x 4  EXT: no clubbing, cyanosis, or edema  NEURO: CN 2-12 intact, MS 5/5 b/l    Current Outpatient Prescriptions   Medication Sig Dispense Refill     docusate calcium (SURFAK) 240 MG capsule Take 240 mg by mouth       FLUZONE HIGH-DOSE 0.5 ML injection TO BE ADMINISTERED BY PHARMACIST FOR IMMUNIZATION  0     methylPREDNISolone (MEDROL) 32 MG tablet 1 tablet PO 12 hours before CT and 1 tablet PO 2 hours before CT 2 tablet 0     methylPREDNISolone (MEDROL) 32 MG tablet Take one tablet at 9PM tonight 9-26, take a second tablet at 7AM on 9-27 (Patient not taking: Reported on 6/6/2018) 2 tablet 0     multivitamin, therapeutic with minerals (MULTI-VITAMIN) TABS Take 1 tablet by mouth daily       pilocarpine (SALAGEN) 5 MG tablet Take 1 tablet (5 mg) by mouth 4 times daily as needed 360 tablet 3     polyethylene glycol (MIRALAX/GLYCOLAX) Packet Take 17 g by mouth             Recent Labs   Lab Test  12/05/17   1433   WBC  4.7   HGB  14.1   PLT  158      @labrcent[na,potassium,chloride,co2,bun,cr@  Recent Labs   Lab Test  12/05/17   1433   PROTTOTAL  6.9   ALBUMIN  3.7   BILITOTAL  0.5   AST  15   ALT  18   ALKPHOS  52         Results for orders placed or performed in visit on 09/27/17   CT Soft tissue neck w contrast*    Narrative    CT SOFT TISSUE NECK W CONTRAST 9/27/2017 9:29 AM    History:  Patient is diagnosed with SCC 03/18/15, 03/24/2015 PET/CT  showed 15 mm hypermetabolic mass in the left piriform sinus, status  post chemoradiation and modified radial neck dissection.       Comparison:  CT neck 12/20/2016, 8/27/2015     Technique: Following intravenous administration of nonionic iodinated  contrast medium, thin section helical CT images were obtained from the  skull base down to the level of the aortic arch.  Axial, coronal and  sagittal reformations were performed with 3 mm slice thickness  reconstruction. Images were reviewed in soft tissue, lung and bone  windows.    Contrast: Isovue 370 69cc.    Findings:   Postsurgical changes of left neck dissection and prior radiation  therapy with asymmetric left loss of the normal deep fascial planes,  resection of the left submandibular gland, ligation of the left  internal jugular vein, supraglottic laryngeal edema and atrophy of the  thyroid gland. No mucosal space mass or cervical lymphadenopathy.    Parotid glands are unremarkable. Patent major cervical arterial  vasculature. No destructive bone lesion. Multilevel cervical  spondylosis. Polypoid mucosal thickening in the maxillary sinuses.  Bilateral pseudophakia. Visualized intracranial contents are  unremarkable.    Biapical pleural fibrosis. No visualized suspicious pulmonary nodule.      Impression    Impression:  No significant change since 12/20/2016. No evidence of recurrent or  metastatic squamous cell carcinoma.    I have personally reviewed the examination and initial interpretation  and I agree with the findings.    HILARIA ALMEIDA MD            Assessment/Plan  Locally advanced hypopharyngeal sq cell ca - He is now about 3 years out from completion of therapy.  He is ENRIQUE. He had a neck dissection in 9/2015 and will have restaging scans at that time.    Thyroid fxn - Tsh is slightly up. T4 is pending. We will repeat in September. I think it is inevitable that he will need synthroid replacement, but he would like to wait which is reasonable. He is asymptomatic.     Constipation - this is long-standing and not clear why.  Could be related to hernia, but not sure if that is true.  Will make a referral to GI.     I spent 35 minutes with the patient.  >50% of the time was spent in counseling and coordination of care.    [facility]   of Medicine  Division of Hematology, Oncology, and Transplantation

## 2018-06-12 NOTE — NURSING NOTE
Chief Complaint   Patient presents with     Blood Draw     Labs drawn via  by RN, VS taken     Lu Giron RN

## 2018-09-05 ENCOUNTER — RADIANT APPOINTMENT (OUTPATIENT)
Dept: CT IMAGING | Facility: CLINIC | Age: 78
End: 2018-09-05
Payer: COMMERCIAL

## 2018-09-05 ENCOUNTER — OFFICE VISIT (OUTPATIENT)
Dept: OTOLARYNGOLOGY | Facility: CLINIC | Age: 78
End: 2018-09-05
Payer: COMMERCIAL

## 2018-09-05 VITALS — HEIGHT: 66 IN | BODY MASS INDEX: 22.98 KG/M2 | WEIGHT: 143 LBS

## 2018-09-05 DIAGNOSIS — C12 SQUAMOUS CELL CARCINOMA OF PYRIFORM SINUS (H): Primary | ICD-10-CM

## 2018-09-05 DIAGNOSIS — C12 MALIGNANT NEOPLASM OF PYRIFORM SINUS (H): Primary | ICD-10-CM

## 2018-09-05 DIAGNOSIS — C13.9 SQUAMOUS CELL CANCER OF HYPOPHARYNX (H): ICD-10-CM

## 2018-09-05 DIAGNOSIS — C12 MALIGNANT NEOPLASM OF PYRIFORM SINUS (H): ICD-10-CM

## 2018-09-05 LAB
ALBUMIN SERPL-MCNC: 4 G/DL (ref 3.4–5)
ALP SERPL-CCNC: 54 U/L (ref 40–150)
ALT SERPL W P-5'-P-CCNC: 20 U/L (ref 0–70)
ANION GAP SERPL CALCULATED.3IONS-SCNC: 7 MMOL/L (ref 3–14)
AST SERPL W P-5'-P-CCNC: 16 U/L (ref 0–45)
BASOPHILS # BLD AUTO: 0 10E9/L (ref 0–0.2)
BASOPHILS NFR BLD AUTO: 0 %
BILIRUB SERPL-MCNC: 0.7 MG/DL (ref 0.2–1.3)
BUN SERPL-MCNC: 14 MG/DL (ref 7–30)
CALCIUM SERPL-MCNC: 8.7 MG/DL (ref 8.5–10.1)
CHLORIDE SERPL-SCNC: 101 MMOL/L (ref 94–109)
CO2 SERPL-SCNC: 25 MMOL/L (ref 20–32)
CREAT BLD-MCNC: 0.8 MG/DL (ref 0.66–1.25)
CREAT SERPL-MCNC: 0.76 MG/DL (ref 0.66–1.25)
DIFFERENTIAL METHOD BLD: ABNORMAL
EOSINOPHIL # BLD AUTO: 0 10E9/L (ref 0–0.7)
EOSINOPHIL NFR BLD AUTO: 0 %
ERYTHROCYTE [DISTWIDTH] IN BLOOD BY AUTOMATED COUNT: 13.6 % (ref 10–15)
GFR SERPL CREATININE-BSD FRML MDRD: >90 ML/MIN/1.7M2
GFR SERPL CREATININE-BSD FRML MDRD: >90 ML/MIN/1.7M2
GLUCOSE SERPL-MCNC: 123 MG/DL (ref 70–99)
HCT VFR BLD AUTO: 44.2 % (ref 40–53)
HGB BLD-MCNC: 15.3 G/DL (ref 13.3–17.7)
IMM GRANULOCYTES # BLD: 0 10E9/L (ref 0–0.4)
IMM GRANULOCYTES NFR BLD: 0.4 %
LYMPHOCYTES # BLD AUTO: 0.6 10E9/L (ref 0.8–5.3)
LYMPHOCYTES NFR BLD AUTO: 7.1 %
MCH RBC QN AUTO: 31.7 PG (ref 26.5–33)
MCHC RBC AUTO-ENTMCNC: 34.6 G/DL (ref 31.5–36.5)
MCV RBC AUTO: 92 FL (ref 78–100)
MONOCYTES # BLD AUTO: 0.1 10E9/L (ref 0–1.3)
MONOCYTES NFR BLD AUTO: 0.6 %
NEUTROPHILS # BLD AUTO: 7.2 10E9/L (ref 1.6–8.3)
NEUTROPHILS NFR BLD AUTO: 91.9 %
NRBC # BLD AUTO: 0 10*3/UL
NRBC BLD AUTO-RTO: 0 /100
PLATELET # BLD AUTO: 158 10E9/L (ref 150–450)
POTASSIUM SERPL-SCNC: 4.2 MMOL/L (ref 3.4–5.3)
PROT SERPL-MCNC: 7.6 G/DL (ref 6.8–8.8)
RBC # BLD AUTO: 4.83 10E12/L (ref 4.4–5.9)
SODIUM SERPL-SCNC: 133 MMOL/L (ref 133–144)
WBC # BLD AUTO: 7.8 10E9/L (ref 4–11)

## 2018-09-05 PROCEDURE — 85025 COMPLETE CBC W/AUTO DIFF WBC: CPT | Performed by: INTERNAL MEDICINE

## 2018-09-05 PROCEDURE — 80053 COMPREHEN METABOLIC PANEL: CPT | Performed by: INTERNAL MEDICINE

## 2018-09-05 RX ORDER — IOPAMIDOL 755 MG/ML
71 INJECTION, SOLUTION INTRAVASCULAR ONCE
Status: COMPLETED | OUTPATIENT
Start: 2018-09-05 | End: 2018-09-05

## 2018-09-05 RX ORDER — IBUPROFEN 200 MG
200-400 TABLET ORAL
COMMUNITY
Start: 2018-07-03

## 2018-09-05 RX ORDER — ACETAMINOPHEN 500 MG
1000 TABLET ORAL
COMMUNITY
Start: 2018-07-03

## 2018-09-05 RX ADMIN — IOPAMIDOL 71 ML: 755 INJECTION, SOLUTION INTRAVASCULAR at 08:43

## 2018-09-05 ASSESSMENT — PAIN SCALES - GENERAL: PAINLEVEL: NO PAIN (0)

## 2018-09-05 NOTE — NURSING NOTE
Chief Complaint   Patient presents with     RECHECK     follow up post throat cancer.       Kartik Aviles RN

## 2018-09-05 NOTE — MR AVS SNAPSHOT
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)    -  1       Follow-ups after your visit        Your next 10 appointments already scheduled     Sep 11, 2018  4:00 PM CDT   (Arrive by 3:45 PM)   Return Visit with DO HEIDY Jalloh [facility] ([facility])    [address]  [address]  [facility] [address] [phone]              Who to contact     Please call your clinic at [phone] to:    Ask questions about your health    Make or cancel appointments    Discuss your medicines    Learn about your test results    Speak to your doctor            Additional Information About Your Visit        [employer] Information     [employer] gives you secure access to your electronic health record. If you see a primary care provider, you can also send messages to your care team and make appointments. If you have questions, please call your primary care clinic.  If you do not have a primary care provider, please call [phone] and they will assist you.      [employer] is an electronic gateway that provides easy, online access to your medical records. With [employer], you can request a clinic appointment, read your test results, renew a prescription or communicate with your care team.     To access your existing account, please contact your [facility] or call [phone] for assistance.        Care EveryWhere ID     This is your Care EveryWhere ID. This could be used by other organizations to access your [place] medical records  [id number]        Your Vitals Were     Height BMI (Body Mass Index)                1.676 m (5' 6\") " 23.08 kg/m2           Blood Pressure from Last 3 Encounters:   06/12/18 112/71   06/06/18 119/76   12/05/17 132/72    Weight from Last 3 Encounters:   09/05/18 64.9 kg (143 lb)   06/12/18 66.5 kg (146 lb 11.2 oz)   06/06/18 66.2 kg (146 lb)              We Performed the Following     IMAGESTREAM RECORDING ORDER     LARYNGOSCOPY FLEX FIBEROPTIC, DIAGNOSTIC        Primary Care Provider Office Phone # Fax #    Luzmaria Frantz Morrissey [phone]       [address] [facility] [address]        Equal Access to Services     [facility]: Hadii maria victoria reyeso Rebekah, waaxda luqadaha, qaybta kaalmada claudia, melvin seay . So [facility] [phone].    ATENCIÓN: Si habla español, tiene a martin disposición servicios gratuitos de asistencia lingüística. Llame al [phone].    We comply with applicable federal civil rights laws and [place] laws. We do not discriminate on the basis of race, color, national origin, age, disability, sex, sexual orientation, or gender identity.            Thank you!     Thank you for choosing [facility]  for your care. Our goal is always to provide you with excellent care. Hearing back from our patients is one way we can continue to improve our services. Please take a few minutes to complete the written survey that you may receive in the mail after your visit with us. Thank you!             Your Updated Medication List - Protect others around you: Learn how to safely use, store and throw away your medicines at www.disposemymeds.org.          This list is accurate as of 9/5/18  5:07 PM.  Always use your most recent med list.                   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), Malignant neoplasm of pyriform sinus (H)        ibuprofen 200 MG tablet    ADVIL/MOTRIN     Take 200-400 mg by mouth        * methylPREDNISolone 32 MG tablet    MEDROL    2 tablet    Take one tablet at 9PM tonight 9-26, take a second tablet at 7AM on 9-27    Allergic state       * methylPREDNISolone 32 MG tablet    MEDROL    2 tablet    1 tablet PO 12 hours before CT and 1 tablet PO 2 hours before CT    Contrast media allergy       Multi-vitamin Tabs tablet      Take 1 tablet by mouth daily        polyethylene glycol Packet    MIRALAX/GLYCOLAX     Take 17 g by mouth        * Notice:  This list has 2 medication(s) that are the same as other medications prescribed for you. Read the directions carefully, and ask your doctor or other care provider to review them with you.

## 2018-09-05 NOTE — PROGRESS NOTES
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 Patient did quite well but a residual node was noted at the 3-month PET/CT scan so he underwent a left neck dissection 9/9/2015. Pathology from the neck dissection on September 9th showed 1 out of 3 nodes being positive and that one node containing nonviable tumor.    HISTORY OF PRESENT ILLNESS:  Mr. Morales has returned to our clinic for follow-up.  He is now almost 3 years out from his neck dissection. He had his CT scans done today. Patient is doing very well and has no complaints. He still continues to have the occasional hoarseness and cough but this has not changed since completion of his treatment years ago. He does tell me that he stopped his Pilocarpine medication as he felt he didn't need it anymore. His xerostomia isn't bothering him and he would like to continue without the Pilocarpine for now. Patient denies any unexpected weight loss, referred otalgia, odynophagia, dysphagia, hemoptysis, pain, bleeding, and recurrent lumps or bumps.    REVIEW OF SYSTEMS:   ENT ROS 8/28/2018   Constitutional -   Ears, Nose, Throat Hoarseness   Cardiopulmonary Cough   Gastrointestinal/Genitourinary Heartburn/indigestion, Constipation   Hematologic -   Endocrine Frequent urination      10 point ROS neg other than the symptoms noted above in the HPI.    PHYSICAL EXAMINATION:    Ht 1.676 m (5' 6\")  Wt 64.9 kg (143 lb)  BMI 23.08 kg/m2  Constitutional:  The patient was accompanied by his wife and son, well-groomed, and in no acute distress.     Oral Cavity: Normal tongue, floor of mouth, buccal mucosa, and palate.  No lesions or masses on inspection or palpation. Healthy alveolar ridges.    Oropharynx: Normal mucosa, palate symmetric " "with normal elevation. No abnormal lymph tissue in the oropharynx. Base of tongue, vallecula, and epiglottis soft to palpation with no induration or masses.     Neck: Right side is supple. Left neck incision is well healed. Left side is still stiff secondary to radiation and surgical changes.    Lymphatic: There is no palpable lymphadenopathy in the neck.      PROCEDURE:   FIBEROPTIC ENDOSCOPY:  Consent for fiberoptic laryngoscopy was obtained, and we confirmed correctness of procedure and identity of patient.  Fiberoptic laryngoscopy was indicated due to history of left pyriform sinus SCC.  The nose was topically decongested and anesthetized.  The fiberoptic laryngoscope was passed under endoscopic vision through the right nare.  The turbinates were normal.  The inferior and middle meati were clear bilaterally without purulence, masses, or polyps.  The nasopharynx was clear.  The Eustachian tubes were clear.  The soft palate appeared normal with good mobility and some lymphoid tissue. Laryngeal surface of the epiglottis has a red hematoma midline. The epiglottis was sharp and the visualized portion of the vallecula was clear.  The larynx was clear with mobile cords. The arytenoids were clear and there was no pooling in the hypopharynx. Both pyriform sinuses were open today and I was able to visualize them clearly. When patient says \"eee\" the left thyroid cartilage ala protrudes into the hypopharynx.    Saying \"eee\":      At rest:        IMAGING:  CT neck:  FINDINGS: Postsurgical changes neck dissection and radiation therapy with loss  of normal deep fascial planes, resection of left submandibular gland,  ligation of the left internal jugular vein. No mucosal space mass or  cervical lymphadenopathy.   There is atrophy of  thyroid gland.     There is no evident cervical lymphadenopathy. The fascial spaces in  the neck are intact bilaterally. The major vascular structures in the  neck appear unremarkable.     Cervical " "spondylosis however no worrisome lytic or sclerotic lesion.  No overt spinal canal or neuroforaminal stenosis. Polypoid mucosal  thickening seen in the maxillary sinuses is unchanged since prior.  Rest of the the visualized paranasal sinuses are clear. The mastoid  air cells are clear.      The visualized lung apices are clear.     IMPRESSION:  No significant change since 12/20/2016. No evidence of recurrence or  metastatic squamous carcinoma.     CT chest:  FINDINGS: Biapical fibrotic changes again noted. Mucoid debris in  midtrachea. Linear bands of atelectasis in the lingula and the right  lower lobe. Minimal basilar peripheral fibrotic changes.     Mild coronary calcifications. No mediastinal, hilar or axillary  adenopathy. Heart, Main pulmonary artery and aorta are not enlarged.  No pleural pericardial effusion.     Evaluation of the upper abdomen is limited.     Bones: No suspicious bony lesions.      IMPRESSION:   No evidence of metastasis in the chest.      ASSESSMENT AND PLAN:  Chema Morales is a 78 year old male almost 3 years s/p left neck dissection and chemoradiation for a T4a N1 SCC of the left pyriform sinus. Patient is doing well today and scans were clear. Because the patient is doing well and CT scans were clear, we will observe the exophytic mass found when saying \"eee\" on endoscopy for now. I will also review the footage with Dr. Wynne. We will follow-up with the patient in 6 months for oncologic surveillance.       ADDENDUM:  After reviewing the flex scope video with Dr. Wynne, the exophytic mass found when patient says \"eee\" is just the ala of the thyroid cartilage. This is a very common thing to see and is not concerning. Patient's son was notified of this.      Kay Ortiz PA-C  Otolaryngology - Head & Neck Surgery  [phone]        CC:  Boone Wynne MD  Otolaryngology/Head & Neck Surgery  [facility]     Delfina Rhodes MD    Otolaryngology Clinic   [facility]       Pawan Morrissey MD "   [address], [place] [address]        Joanie Yeboah MD   [place] [address]

## 2018-09-05 NOTE — DISCHARGE INSTRUCTIONS

## 2018-09-05 NOTE — NURSING NOTE
Chief Complaint   Patient presents with     Blood Draw     NO LAB ORDERS   Pt. Did not have any lab orders. Purple, Green, and Red Gel JIC tubes were sent. IV removed, Pt. Tolerated well. See Flowsheets.  Flynn Nunez, A

## 2018-09-05 NOTE — DISCHARGE INSTRUCTIONS

## 2018-09-05 NOTE — LETTER
"9/5/2018       RE: Chema Morales  [address]  [facility] [address]     Dear Colleague,    Thank you for referring your patient, Chema Morales, to the [facility] EAR NOSE AND THROAT at [facility]. Please see a copy of my visit note below.    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 Patient did quite well but a residual node was noted at the 3-month PET/CT scan so he underwent a left neck dissection 9/9/2015. Pathology from the neck dissection on September 9th showed 1 out of 3 nodes being positive and that one node containing nonviable tumor.    HISTORY OF PRESENT ILLNESS:  Mr. Morales has returned to our clinic for follow-up.  He is now almost 3 years out from his neck dissection. He had his CT scans done today. Patient is doing very well and has no complaints. He still continues to have the occasional hoarseness and cough but this has not changed since completion of his treatment years ago. He does tell me that he stopped his Pilocarpine medication as he felt he didn't need it anymore. His xerostomia isn't bothering him and he would like to continue without the Pilocarpine for now. Patient denies any unexpected weight loss, referred otalgia, odynophagia, dysphagia, hemoptysis, pain, bleeding, and recurrent lumps or bumps.    REVIEW OF SYSTEMS:   ENT ROS 8/28/2018   Constitutional -   Ears, Nose, Throat Hoarseness   Cardiopulmonary Cough   Gastrointestinal/Genitourinary Heartburn/indigestion, Constipation   Hematologic -   Endocrine Frequent urination      10 point ROS neg other than the symptoms noted above in the HPI.    PHYSICAL EXAMINATION:    Ht 1.676 m (5' 6\")  Wt 64.9 kg (143 lb)  BMI 23.08 " "kg/m2  Constitutional:  The patient was accompanied by his wife and son, well-groomed, and in no acute distress.     Oral Cavity: Normal tongue, floor of mouth, buccal mucosa, and palate.  No lesions or masses on inspection or palpation. Healthy alveolar ridges.    Oropharynx: Normal mucosa, palate symmetric with normal elevation. No abnormal lymph tissue in the oropharynx. Base of tongue, vallecula, and epiglottis soft to palpation with no induration or masses.     Neck: Right side is supple. Left neck incision is well healed. Left side is still stiff secondary to radiation and surgical changes.    Lymphatic: There is no palpable lymphadenopathy in the neck.      PROCEDURE:   FIBEROPTIC ENDOSCOPY:  Consent for fiberoptic laryngoscopy was obtained, and we confirmed correctness of procedure and identity of patient.  Fiberoptic laryngoscopy was indicated due to history of left pyriform sinus SCC.  The nose was topically decongested and anesthetized.  The fiberoptic laryngoscope was passed under endoscopic vision through the right nare.  The turbinates were normal.  The inferior and middle meati were clear bilaterally without purulence, masses, or polyps.  The nasopharynx was clear.  The Eustachian tubes were clear.  The soft palate appeared normal with good mobility and some lymphoid tissue.  Laryngeal surface of the epiglottis has a red hematoma midline. The epiglottis was sharp and the visualized portion of the vallecula was clear.  The larynx was clear with mobile cords. The arytenoids were clear and there was no pooling in the hypopharynx. Both pyriform sinuses were open today and I was able to visualize them clearly. When the patient was saying \"eee\" an exophytic mass protruded from the left wall of the oropharynx just superior to the left pyriform sinus. At rest, it is not obviously noticeable.    Saying \"eee\":      At rest:        IMAGING:  CT neck:  FINDINGS: Postsurgical changes neck dissection and radiation " "therapy with loss  of normal deep fascial planes, resection of left submandibular gland,  ligation of the left internal jugular vein. No mucosal space mass or  cervical lymphadenopathy.   There is atrophy of  thyroid gland.     There is no evident cervical lymphadenopathy. The fascial spaces in  the neck are intact bilaterally. The major vascular structures in the  neck appear unremarkable.     Cervical spondylosis however no worrisome lytic or sclerotic lesion.  No overt spinal canal or neuroforaminal stenosis. Polypoid mucosal  thickening seen in the maxillary sinuses is unchanged since prior.  Rest of the the visualized paranasal sinuses are clear. The mastoid  air cells are clear.      The visualized lung apices are clear.     IMPRESSION:  No significant change since 12/20/2016. No evidence of recurrence or  metastatic squamous carcinoma.     CT chest:  FINDINGS: Biapical fibrotic changes again noted. Mucoid debris in  midtrachea. Linear bands of atelectasis in the lingula and the right  lower lobe. Minimal basilar peripheral fibrotic changes.     Mild coronary calcifications. No mediastinal, hilar or axillary  adenopathy. Heart, Main pulmonary artery and aorta are not enlarged.  No pleural pericardial effusion.     Evaluation of the upper abdomen is limited.     Bones: No suspicious bony lesions.      IMPRESSION:   No evidence of metastasis in the chest.      ASSESSMENT AND PLAN:  Chema Morales is a 78 year old male almost 3 years s/p left neck dissection and chemoradiation for a T4a N1 SCC of the left pyriform sinus. Patient is doing well today and scans were clear. Because the patient is doing well and CT scans were clear, we will observe the exophytic mass found when saying \"eee\" on endoscopy for now. I will also review the footage with Dr. Wynne. We will follow-up with the patient in 6 months for oncologic surveillance.       Kay Ortiz PA-C  Otolaryngology - Head & Neck " Surgery  [phone]        CC:  Boone Wynne MD  Otolaryngology/Head & Neck Surgery  [facility]     Delfina Rhodes MD    Otolaryngology Clinic   [facility]       Pawan Morrissey MD   [address], [place] [address]        Joanie Yeboah MD   [facility]

## 2018-09-11 ENCOUNTER — ONCOLOGY VISIT (OUTPATIENT)
Dept: ONCOLOGY | Facility: CLINIC | Age: 78
End: 2018-09-11
Attending: INTERNAL MEDICINE
Payer: COMMERCIAL

## 2018-09-11 VITALS
RESPIRATION RATE: 16 BRPM | SYSTOLIC BLOOD PRESSURE: 112 MMHG | HEIGHT: 66 IN | WEIGHT: 145.4 LBS | BODY MASS INDEX: 23.37 KG/M2 | HEART RATE: 73 BPM | TEMPERATURE: 97.6 F | DIASTOLIC BLOOD PRESSURE: 71 MMHG | OXYGEN SATURATION: 95 %

## 2018-09-11 DIAGNOSIS — C13.9 SQUAMOUS CELL CANCER OF HYPOPHARYNX (H): ICD-10-CM

## 2018-09-11 DIAGNOSIS — E03.9 HYPOTHYROIDISM, UNSPECIFIED TYPE: Primary | ICD-10-CM

## 2018-09-11 PROCEDURE — 99214 OFFICE O/P EST MOD 30 MIN: CPT | Mod: ZP | Performed by: INTERNAL MEDICINE

## 2018-09-11 PROCEDURE — G0463 HOSPITAL OUTPT CLINIC VISIT: HCPCS | Mod: ZF

## 2018-09-11 RX ORDER — PILOCARPINE HYDROCHLORIDE 5 MG/1
5 TABLET, FILM COATED ORAL 3 TIMES DAILY
Qty: 90 TABLET | Refills: 3 | Status: SHIPPED | OUTPATIENT
Start: 2018-09-11 | End: 2019-02-25

## 2018-09-11 ASSESSMENT — PAIN SCALES - GENERAL: PAINLEVEL: NO PAIN (0)

## 2018-09-11 NOTE — NURSING NOTE
"Oncology Rooming Note    September 11, 2018 4:26 PM   Chema Moarles is a 78 year old male who presents for:    Chief Complaint   Patient presents with     Oncology Clinic Visit     Squamous cell carcinoma of pyriform sinus     Initial Vitals: /71  Pulse 73  Temp 97.6  F (36.4  C) (Oral)  Resp 16  Ht 1.676 m (5' 6\")  Wt 66 kg (145 lb 6.4 oz)  SpO2 95%  BMI 23.47 kg/m2 Estimated body mass index is 23.47 kg/(m^2) as calculated from the following:    Height as of this encounter: 1.676 m (5' 6\").    Weight as of this encounter: 66 kg (145 lb 6.4 oz). Body surface area is 1.75 meters squared.  No Pain (0) Comment: Data Unavailable   No LMP for male patient.  Allergies reviewed: Yes  Medications reviewed: Yes    Medications: Medication refills not needed today.  Pharmacy name entered into [facility]:    [facility] [address]  [facility] [address] [place] [address]    Clinical concerns: No New Concerns    5 minutes for nursing intake (face to face time)     COLIN Carbajal      "

## 2018-09-11 NOTE — MR AVS SNAPSHOT
After Visit Summary   9/11/2018    Chema Morales    MRN: [id number]           Patient Information     Date Of Birth          1940        Visit Information        Provider Department      9/11/2018 4:00 PM Navarro Willingham DO [facility]        Today's Diagnoses     Hypothyroidism, unspecified type    -  1    Squamous cell cancer of hypopharynx (H)           Follow-ups after your visit        Your next 10 appointments already scheduled     Mar 12, 2019  4:00 PM CDT   [facility] Draw with  [employer] LAB DRAW   [facility] ([facility])    [address] [place] [address]  [address]  [facility] [id number] [phone]            Mar 12, 2019  4:30 PM CDT   (Arrive by 4:15 PM)   Return Visit with Navarro Willingham DO   [facility] ([facility])    [address] [place] [address]  [address]  [facility] [id number] [phone]              Future tests that were ordered for you today     Open Future Orders        Priority Expected Expires Ordered    TSH with free T4 reflex Routine  9/11/2019 9/11/2018            Who to contact     If you have questions or need follow up information about today's clinic visit or your schedule please contact [facility] directly at [phone].  Normal or non-critical lab and imaging results will be communicated to you by MyChart, letter or phone within 4 business days after the clinic has received the results. If you do not hear from us within 7 days, please contact the clinic through MyChart or phone. If you have a critical or abnormal lab result, we will notify you by phone as soon as possible.  Submit refill requests through [employer] or call your pharmacy and they will forward the refill request to us. Please allow 3 business days for your refill to be completed.          Additional Information About Your Visit        MyChart Information     [facility]  "gives you secure access to your electronic health record. If you see a primary care provider, you can also send messages to your care team and make appointments. If you have questions, please call your primary care clinic.  If you do not have a primary care provider, please call [phone] and they will assist you.        Care EveryWhere ID     This is your Care EveryWhere ID. This could be used by other organizations to access your [facility] medical records  [id number]        Your Vitals Were     Pulse Temperature Respirations Height Pulse Oximetry BMI (Body Mass Index)    73 97.6  F (36.4  C) (Oral) 16 1.676 m (5' 6\") 95% 23.47 kg/m2       Blood Pressure from Last 3 Encounters:   09/11/18 112/71   06/12/18 112/71   06/06/18 119/76    Weight from Last 3 Encounters:   09/11/18 66 kg (145 lb 6.4 oz)   09/05/18 64.9 kg (143 lb)   06/12/18 66.5 kg (146 lb 11.2 oz)                 Today's Medication Changes          These changes are accurate as of 9/11/18 11:59 PM.  If you have any questions, ask your nurse or doctor.               These medicines have changed or have updated prescriptions.        Dose/Directions    * PILOCARPINE HCL PO   This may have changed:  Another medication with the same name was added. Make sure you understand how and when to take each.   Used for:  Hypothyroidism, unspecified type, Squamous cell cancer of hypopharynx (H)   Changed by:  Navarro Willingham DO        Refills:  0       * pilocarpine 5 MG tablet   Commonly known as:  SALAGEN   This may have changed:  You were already taking a medication with the same name, and this prescription was added. Make sure you understand how and when to take each.   Used for:  Hypothyroidism, unspecified type, Squamous cell cancer of hypopharynx (H)   Changed by:  Navarro Willingham DO        Dose:  5 mg   Take 1 tablet (5 mg) by mouth 3 times daily   Quantity:  90 tablet   Refills:  3       * Notice:  This list has 2 medication(s) that are the " same as other medications prescribed for you. Read the directions carefully, and ask your doctor or other care provider to review them with you.         Where to get your medicines      These medications were sent to [facility] [address] [facility] - [place], [place] - [address]  [facility] [address]     Phone:  [phone]     pilocarpine 5 MG tablet                Primary Care Provider Office Phone # Fax #    Luzmaria Morrissey [phone]       [address] [facility] [address]        Equal Access to Services     [facility]: Hadii aad ku hadasho Soomaali, waaxda luqadaha, qaybta kaalmada adedriss, melvin seay . So [facility] [phone].    ATENCIÓN: Si habla español, tiene a martin disposición servicios gratuitos de asistencia lingüística. [facility] [phone].    We comply with applicable federal civil rights laws and [place] laws. We do not discriminate on the basis of race, color, national origin, age, disability, sex, sexual orientation, or gender identity.            Thank you!     Thank you for choosing [facility]  for your care. Our goal is always to provide you with excellent care. Hearing back from our patients is one way we can continue to improve our services. Please take a few minutes to complete the written survey that you may receive in the mail after your visit with us. Thank you!             Your Updated Medication List - Protect others around you: Learn how to safely use, store and throw away your medicines at www.disposemymeds.org.          This list is accurate as of 9/11/18 11:59 PM.  Always use your most recent med list.                   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), Malignant neoplasm of pyriform  sinus (H)       ibuprofen 200 MG tablet    ADVIL/MOTRIN     Take 200-400 mg by mouth        * methylPREDNISolone 32 MG tablet    MEDROL    2 tablet    Take one tablet at 9PM tonight 9-26, take a second tablet at 7AM on 9-27    Allergic state       * methylPREDNISolone 32 MG tablet    MEDROL    2 tablet    1 tablet PO 12 hours before CT and 1 tablet PO 2 hours before CT    Contrast media allergy       Multi-vitamin Tabs tablet      Take 1 tablet by mouth daily        * PILOCARPINE HCL PO       Hypothyroidism, unspecified type, Squamous cell cancer of hypopharynx (H)       * pilocarpine 5 MG tablet    SALAGEN    90 tablet    Take 1 tablet (5 mg) by mouth 3 times daily    Hypothyroidism, unspecified type, Squamous cell cancer of hypopharynx (H)       polyethylene glycol Packet    MIRALAX/GLYCOLAX     Take 17 g by mouth        * Notice:  This list has 4 medication(s) that are the same as other medications prescribed for you. Read the directions carefully, and ask your doctor or other care provider to review them with you.

## 2018-09-11 NOTE — LETTER
"9/11/2018       RE: Chema Morales  [address]  [facility] [address]     Dear Colleague,    Thank you for referring your patient, Chema Morales, to the [facility] CANCER [facility]. Please see a copy of my visit note below.    REASON FOR VISIT:    CANCER STAGE: Squamous cell cancer of hypopharynx (H)    Staging form: Pharynx - Hypopharynx, AJCC 7th Edition    - Clinical: Stage NORAH (T4a, N1, M0) - Signed by Derian Blair MD on 3/31/2015      HISTORY OF PRESENT ILLNESS:  Chema returns in follow up. He has had an uneventful summer. He feels well. He feels that his voice continues to improve and he almost feels like it is nearly normal.  He otherwise has no new complaints.  He did try stopping the pilocarpine, but feels that his dry mouth is worse.  He would like to resume taking this.    He otherwise has no new aches or pains. He is eating and drinking well.  He just says he is concerned about his thyroid - but has no symptoms of this, just that his TSH was elevated before and he is worried about his blood sugars being a little bit high.  He has no polyuria and no weight loss.     Review Of Systems  10-point review of systems were negative except as noted in HPI.        EXAM:  Blood pressure 112/71, pulse 73, temperature 97.6  F (36.4  C), temperature source Oral, resp. rate 16, height 1.676 m (5' 6\"), weight 66 kg (145 lb 6.4 oz), SpO2 95 %.  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   HT: reg rate and rhythm, no murmurs  LUNGS: clear to auscultation bilaterally  ABD: soft, nt, nd, +bs x 4  EXT: no clubbing, cyanosis, or edema  NEURO: CN 2-12 intact, MS 5/5 b/l    Current Outpatient Prescriptions   Medication Sig Dispense Refill     acetaminophen (TYLENOL) 500 MG tablet Take 1,000 mg by mouth       docusate calcium (SURFAK) 240 MG capsule Take 240 mg " by mouth       ibuprofen (ADVIL/MOTRIN) 200 MG tablet Take 200-400 mg by mouth       methylPREDNISolone (MEDROL) 32 MG tablet Take one tablet at 9PM tonight 9-26, take a second tablet at 7AM on 9-27 2 tablet 0     multivitamin, therapeutic with minerals (MULTI-VITAMIN) TABS Take 1 tablet by mouth daily       PILOCARPINE HCL PO        polyethylene glycol (MIRALAX/GLYCOLAX) Packet Take 17 g by mouth       methylPREDNISolone (MEDROL) 32 MG tablet 1 tablet PO 12 hours before CT and 1 tablet PO 2 hours before CT (Patient not taking: Reported on 6/12/2018) 2 tablet 0           Recent Labs   Lab Test  09/05/18   1030   WBC  7.8   HGB  15.3   PLT  158     @labrcent[na,potassium,chloride,co2,bun,cr@  Recent Labs   Lab Test  09/05/18   1030   PROTTOTAL  7.6   ALBUMIN  4.0   BILITOTAL  0.7   AST  16   ALT  20   ALKPHOS  54         Recent Results (from the past 744 hour(s))   CT Chest w contrast*    Narrative    CT of the chest without contrast    HISTORY: Squamous cell cancer of the hypopharynx    COMPARISON STUDY: 9/27/2017.    FINDINGS: Biapical fibrotic changes again noted. Mucoid debris in  midtrachea. Linear bands of atelectasis in the lingula and the right  lower lobe. Minimal basilar peripheral fibrotic changes.    Mild coronary calcifications. No mediastinal, hilar or axillary  adenopathy. Heart, Main pulmonary artery and aorta are not enlarged.  No pleural pericardial effusion.    Evaluation of the upper abdomen is limited.    Bones: No suspicious bony lesions.      Impression    IMPRESSION: No evidence of metastasis in the chest.    SAVAGE VALENTE MD   CT Soft tissue neck w contrast    Narrative    CT SOFT TISSUE NECK W CONTRAST 9/5/2018 8:56 AM    History:  ; Squamous cell cancer of hypopharynx, treated with  chemoradiotherapy and left neck dissection 9/9/2015. Suspicious  laryngoscopic examination.   ICD-10: Squamous cell cancer of hypopharynx (H)      Comparison:  CT scan 12/20/2016 and 9/27/2017.     Technique:  Following intravenous administration of nonionic iodinated  contrast medium, thin section helical CT images were obtained from the  skull base down to the level of the aortic arch.  Axial, coronal and  sagittal reformations were performed with 2-3 mm slice thickness  reconstruction. Images were reviewed in soft tissue, lung and bone  windows.    Contrast: Isovue 370 71cc    Findings:     Postsurgical changes neck dissection and radiation therapy with loss  of normal deep fascial planes, resection of left submandibular gland,  ligation of the left internal jugular vein. No mucosal space mass or  cervical lymphadenopathy.   There is atrophy of  thyroid gland.    There is no evident cervical lymphadenopathy. The fascial spaces in  the neck are intact bilaterally. The major vascular structures in the  neck appear unremarkable.    Cervical spondylosis however no worrisome lytic or sclerotic lesion.  No overt spinal canal or neuroforaminal stenosis. Polypoid mucosal  thickening seen in the maxillary sinuses is unchanged since prior.  Rest of the the visualized paranasal sinuses are clear. The mastoid  air cells are clear.     The visualized lung apices are clear.      Impression    Impression:    No significant change since 12/20/2016. No evidence of recurrence or  metastatic squamous carcinoma. .     I have personally reviewed the examination and initial interpretation  and I agree with the findings.    ASHLEY PEREZ MD           Assessment/Plan  [id number] squamous cell ca of the hypopharynx - He is now 3 years out from completion of chemoradiation. His last scan on 9/5/18 did not show any evidence of recurrence.  He had a laryngoscopy in ENT clinic by Kay Ortiz that demonstrated a possible mass-like protrusion only when he was using his voice.  While it is reassuring that there was nothing seen on CT, I think it deserves close follow up.  Dr. Wynne is going to review the images when he returns and will decide on  timing of follow up.  Assuming this is ok, he will need follow up with me in 6 months.  He is planning on travel to [facility] again this winter, so we will have him follow up when he gets back.  I did tell him that if Dr. Wynne's findings required further workup from me, that we would do this sooner.     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  We decided to do a trial of stopping pilocarpine to see if he noticed much difference, and he does think that his mouth is more dry.  He feels he would like to resume it.  I have written an RX for this.     Subclinical hypothryoidism - unfortunately this was not drawn today. I have added it to his blood work.  If it continues to worsen, we may need to start thyroid medication, however, if it remains stable, we will just watch it.     Mild fasting hyperglycemia - I asked him to follow up on this with his PCP.  His fasting sugar was 123 which is mildly elevated.  It would be worthwhile to follow up and he said that he would.        I spent 30 minutes with the patient.  >50% of the time was spent in counseling and coordination of care.    Navarro Willingham   of Medicine  Division of Hematology, Oncology, and Transplantation

## 2018-09-11 NOTE — PROGRESS NOTES
REASON FOR VISIT:    CANCER STAGE: Squamous cell cancer of hypopharynx (H)    Staging form: Pharynx - Hypopharynx, AJCC 7th Edition    - Clinical: Stage NORAH (T4a, N1, M0) - Signed by Derian Blair MD on 3/31/2015      HISTORY OF PRESENT ILLNESS:  75 year old gentleman originally from [facility].  -At the beginning of 2015 he developed left-sided odynophagia. He saw Dr. Cailin Rivera in ENT in [place], who performed a flexible endoscopy which revealed a lesion in the left false vocal cord and postcricoid region.   - 03/18/15 diagnosed with SCC (keratinizing type with ulcerstion) by Left supraglottic bx   - There was submucosal edema and fullness with tumor at the left arytenoid and aryepiglottic fold extending into the left piriform sinus. There was an exophytic lesion visualized in the base of the piriform sinus and extending up to the lateral left postcricoid region.   - 03/24/2015 PET/CT showed 15 mm hypermetabolic mass in the left piriform sinus and 16 mm hypermetabolic left level IIA lymph node. No distant mets. There was nonspecific focal FDG uptake in the hilar regions without gross segundo formation, however evaluation on CT is partially limited given lack of IV contrast.   - Stage was NORAH ([id number])  - 4/15/15 started chemoradiation with HD Cisplatin. He was switched to weekly cisplatin with 40 mg /m2 on 5/27/15 for worsening hearing loss and tinnitus.   - 6/3/2015 completed therapy  - 8/27/15 PET scan showed a residual activity in the neck LN. Also there were multiple concerning hilar LN.   - 9/9/15 left modified radical neck dissection. Pathology negative for viable tumor  -scans in 6/2016 show no signs of recurrence on CT chest and CT neck.     Chema returns in follow up. He has had an uneventful summer. He feels well. He feels that his voice continues to improve and he almost feels like it is nearly normal.  He otherwise has no new complaints.  He did try stopping the  "pilocarpine, but feels that his dry mouth is worse.  He would like to resume taking this.    He otherwise has no new aches or pains. He is eating and drinking well.  He just says he is concerned about his thyroid - but has no symptoms of this, just that his TSH was elevated before and he is worried about his blood sugars being a little bit high.  He has no polyuria and no weight loss.     Review Of Systems  10-point review of systems were negative except as noted in HPI.        EXAM:  Blood pressure 112/71, pulse 73, temperature 97.6  F (36.4  C), temperature source Oral, resp. rate 16, height 1.676 m (5' 6\"), weight 66 kg (145 lb 6.4 oz), SpO2 95 %.  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   HT: reg rate and rhythm, no murmurs  LUNGS: clear to auscultation bilaterally  ABD: soft, nt, nd, +bs x 4  EXT: no clubbing, cyanosis, or edema  NEURO: CN 2-12 intact, MS 5/5 b/l    Current Outpatient Prescriptions   Medication Sig Dispense Refill     acetaminophen (TYLENOL) 500 MG tablet Take 1,000 mg by mouth       docusate calcium (SURFAK) 240 MG capsule Take 240 mg by mouth       ibuprofen (ADVIL/MOTRIN) 200 MG tablet Take 200-400 mg by mouth       methylPREDNISolone (MEDROL) 32 MG tablet Take one tablet at 9PM tonight 9-26, take a second tablet at 7AM on 9-27 2 tablet 0     multivitamin, therapeutic with minerals (MULTI-VITAMIN) TABS Take 1 tablet by mouth daily       PILOCARPINE HCL PO        polyethylene glycol (MIRALAX/GLYCOLAX) Packet Take 17 g by mouth       methylPREDNISolone (MEDROL) 32 MG tablet 1 tablet PO 12 hours before CT and 1 tablet PO 2 hours before CT (Patient not taking: Reported on 6/12/2018) 2 tablet 0           Recent Labs   Lab Test  09/05/18   1030   WBC  7.8   HGB  15.3   PLT  158     @labrcent[na,potassium,chloride,co2,bun,cr@  Recent Labs   Lab Test  09/05/18   1030 "   PROTTOTAL  7.6   ALBUMIN  4.0   BILITOTAL  0.7   AST  16   ALT  20   ALKPHOS  54         Recent Results (from the past 744 hour(s))   CT Chest w contrast*    Narrative    CT of the chest without contrast    HISTORY: Squamous cell cancer of the hypopharynx    COMPARISON STUDY: 9/27/2017.    FINDINGS: Biapical fibrotic changes again noted. Mucoid debris in  midtrachea. Linear bands of atelectasis in the lingula and the right  lower lobe. Minimal basilar peripheral fibrotic changes.    Mild coronary calcifications. No mediastinal, hilar or axillary  adenopathy. Heart, Main pulmonary artery and aorta are not enlarged.  No pleural pericardial effusion.    Evaluation of the upper abdomen is limited.    Bones: No suspicious bony lesions.      Impression    IMPRESSION: No evidence of metastasis in the chest.    SAVAGE VALENTE MD   CT Soft tissue neck w contrast    Narrative    CT SOFT TISSUE NECK W CONTRAST 9/5/2018 8:56 AM    History:  ; Squamous cell cancer of hypopharynx, treated with  chemoradiotherapy and left neck dissection 9/9/2015. Suspicious  laryngoscopic examination.   ICD-10: Squamous cell cancer of hypopharynx (H)      Comparison:  CT scan 12/20/2016 and 9/27/2017.     Technique: Following intravenous administration of nonionic iodinated  contrast medium, thin section helical CT images were obtained from the  skull base down to the level of the aortic arch.  Axial, coronal and  sagittal reformations were performed with 2-3 mm slice thickness  reconstruction. Images were reviewed in soft tissue, lung and bone  windows.    Contrast: Isovue 370 71cc    Findings:     Postsurgical changes neck dissection and radiation therapy with loss  of normal deep fascial planes, resection of left submandibular gland,  ligation of the left internal jugular vein. No mucosal space mass or  cervical lymphadenopathy.   There is atrophy of  thyroid gland.    There is no evident cervical lymphadenopathy. The fascial spaces in  the  neck are intact bilaterally. The major vascular structures in the  neck appear unremarkable.    Cervical spondylosis however no worrisome lytic or sclerotic lesion.  No overt spinal canal or neuroforaminal stenosis. Polypoid mucosal  thickening seen in the maxillary sinuses is unchanged since prior.  Rest of the the visualized paranasal sinuses are clear. The mastoid  air cells are clear.     The visualized lung apices are clear.      Impression    Impression:    No significant change since 12/20/2016. No evidence of recurrence or  metastatic squamous carcinoma. .     I have personally reviewed the examination and initial interpretation  and I agree with the findings.    ASHLEY PEREZ MD           Assessment/Plan  [id number] squamous cell ca of the hypopharynx - He is now 3 years out from completion of chemoradiation. His last scan on 9/5/18 did not show any evidence of recurrence.  He had a laryngoscopy in ENT clinic by Kay Ortiz that demonstrated a possible mass-like protrusion only when he was using his voice.  While it is reassuring that there was nothing seen on CT, I think it deserves close follow up.  Dr. Wynne is going to review the images when he returns and will decide on timing of follow up.  Assuming this is ok, he will need follow up with me in 6 months.  He is planning on travel to [facility] again this winter, so we will have him follow up when he gets back.  I did tell him that if Dr. Wynne's findings required further workup from me, that we would do this sooner.     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  We decided to do a trial of stopping pilocarpine to see if he noticed much difference, and he does think that his mouth is more dry.  He feels he would like to resume it.  I have written an RX for this.     Subclinical hypothryoidism - unfortunately this was not drawn today. I have added it to his blood work.  If it continues to worsen, we may need to start thyroid medication, however, if it remains  stable, we will just watch it.     Mild fasting hyperglycemia - I asked him to follow up on this with his PCP.  His fasting sugar was 123 which is mildly elevated.  It would be worthwhile to follow up and he said that he would.        I spent 30 minutes with the patient.  >50% of the time was spent in counseling and coordination of care.    Navarro Willingham   of Medicine  Division of Hematology, Oncology, and Transplantation

## 2019-02-25 DIAGNOSIS — C13.9 SQUAMOUS CELL CANCER OF HYPOPHARYNX (H): ICD-10-CM

## 2019-02-25 DIAGNOSIS — E03.9 HYPOTHYROIDISM, UNSPECIFIED TYPE: ICD-10-CM

## 2019-02-25 RX ORDER — PILOCARPINE HYDROCHLORIDE 5 MG/1
5 TABLET, FILM COATED ORAL 3 TIMES DAILY
Qty: 90 TABLET | Refills: 3 | Status: SHIPPED | OUTPATIENT
Start: 2019-02-25 | End: 2020-11-17

## 2019-05-15 ENCOUNTER — OFFICE VISIT (OUTPATIENT)
Dept: OTOLARYNGOLOGY | Facility: CLINIC | Age: 79
End: 2019-05-15
Payer: COMMERCIAL

## 2019-05-15 VITALS
HEART RATE: 80 BPM | HEIGHT: 65 IN | DIASTOLIC BLOOD PRESSURE: 87 MMHG | BODY MASS INDEX: 24.16 KG/M2 | RESPIRATION RATE: 17 BRPM | TEMPERATURE: 98.1 F | SYSTOLIC BLOOD PRESSURE: 129 MMHG | WEIGHT: 145 LBS

## 2019-05-15 DIAGNOSIS — Z91.041 CONTRAST MEDIA ALLERGY: ICD-10-CM

## 2019-05-15 DIAGNOSIS — C12 MALIGNANT NEOPLASM OF PYRIFORM SINUS (H): Primary | ICD-10-CM

## 2019-05-15 RX ORDER — METHYLPREDNISOLONE 32 MG/1
TABLET ORAL
Qty: 2 TABLET | Refills: 0 | Status: SHIPPED | OUTPATIENT
Start: 2019-05-15 | End: 2020-09-16

## 2019-05-15 ASSESSMENT — MIFFLIN-ST. JEOR: SCORE: 1308.98

## 2019-05-15 ASSESSMENT — PAIN SCALES - GENERAL: PAINLEVEL: NO PAIN (0)

## 2019-05-15 NOTE — PROGRESS NOTES
May 15, 2019      PRIOR HISTORY:  Mr. 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  He did quite well, but a residual node was noted at the three-month PET CT scan, and he underwent a neck dissection on 09/09/2015.  Pathology from the neck dissection on September 9 shows 1 out of 3 nodes being positive and that node containing nonviable tumor.      HISTORY OF PRESENT ILLNESS:     Mr. Morales is doing very well.  He stopped taking his pilocarpine a couple of weeks ago again.  He thinks he has gotten a little hoarse, and his son wants him to start taking it, but he feels like his saliva is adequate.  Otherwise, he is doing well.  He wintered in [facility] and feels terrific.      He has been eating very well.  We talked a little bit about the restaurants in the [facility] and the [facility] that we should try.  There have been no symptoms.      PHYSICAL EXAMINATION:  He is accompanied by his son.  Both are delightful as usual.  The oral cavity and oropharynx is clean.  Palpation is completely negative.  The neck has no evidence of adenopathy either.      Fiberoptic Endoscopy:  Consent for fiberoptic laryngoscopy was obtained, and we confirmed correctness of procedure and identity of patient.  Fiberoptic laryngoscopy was indicated to evaluate the piriform sinus.  The nose was topically decongested and anesthetized.  The fiberoptic laryngoscope was passed under endoscopic vision.  The turbinates were normal.  The inferior and middle meati were clear bilaterally without purulence, masses, or polyps.  The nasopharynx was clear.  The Eustachian tubes were clear.  The soft palate appeared normal with good mobility.  The epiglottis was sharp and the visualized portion of the vallecula was clear.  The larynx was clear with mobile cords.  The arytenoids were clear and there was no pooling in the  hypopharynx. There are no changes.  There is still indentation of the left greater ala of the thyroid that pooches into the left lateral hypopharyngeal submucosa.  However, the rest of the hypopharynx looks good.  There is no pooling.  The cords are mobile.       IMPRESSION AND PLAN:  Mr. Morales is now nearly four years out.  We will obtain the four year scan in September, and I will see him before he returns to [facility] this fall.  I have introduced him to Natice Schwab today.      BY/ms   Boone Wynne MD  Otolaryngology/Head & Neck Surgery  [phone]               CC  Delfina Rhodes MD    Otolaryngology Clinic   [facility] [address]             Pawan Morrissey MD   [address], [place] [address]      Joanie Yeboah MD   [facility] 400

## 2019-05-15 NOTE — LETTER
5/15/2019       RE: Chema Morales  [address]  [facility] [place] [facility] [address]     Dear Colleague,    Thank you for referring your patient, Chema Morales, to the [facility] EAR NOSE AND THROAT at [facility]. Please see a copy of my visit note below.    May 15, 2019      PRIOR HISTORY:  Mr. 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  He did quite well, but a residual node was noted at the three-month PET CT scan, and he underwent a neck dissection on 09/09/2015.  Pathology from the neck dissection on September 9 shows 1 out of 3 nodes being positive and that node containing nonviable tumor.      HISTORY OF PRESENT ILLNESS:     Mr. Morales is doing very well.  He stopped taking his pilocarpine a couple of weeks ago again.  He thinks he has gotten a little hoarse, and his son wants him to start taking it, but he feels like his saliva is adequate.  Otherwise, he is doing well.  He wintered in [facility] and feels terrific.      He has been eating very well.  We talked a little bit about the restaurants in the [facility] that we should try.  There have been no symptoms.      PHYSICAL EXAMINATION:  He is accompanied by his son.  Both are delightful as usual.  The oral cavity and oropharynx is clean.  Palpation is completely negative.  The neck has no evidence of adenopathy either.      Fiberoptic Endoscopy:  Consent for fiberoptic laryngoscopy was obtained, and we confirmed correctness of procedure and identity of patient.  Fiberoptic laryngoscopy was indicated to evaluate the piriform sinus.  The nose was topically decongested and anesthetized.  The fiberoptic laryngoscope was passed under endoscopic vision.  The turbinates were normal.  The inferior and middle meati were clear bilaterally without purulence, masses, or polyps.  The  nasopharynx was clear.  The Eustachian tubes were clear.  The soft palate appeared normal with good mobility.  The epiglottis was sharp and the visualized portion of the vallecula was clear.  The larynx was clear with mobile cords.  The arytenoids were clear and there was no pooling in the hypopharynx. There are no changes.  There is still indentation of the left greater ala of the thyroid that pooches into the left lateral hypopharyngeal submucosa.  However, the rest of the hypopharynx looks good.  There is no pooling.  The cords are mobile.       IMPRESSION AND PLAN:  Mr. Morales is now nearly four years out.  We will obtain the four year scan in September, and I will see him before he returns to [facility] this fall.  I have introduced him to Natice Schwab today.      BY/ms   Boone Wynne MD  Otolaryngology/Head & Neck Surgery  [phone]       Delfina Rhodes MD    Otolaryngology Clinic   [facility] [address]             Pawan Morrissey MD   [address], [place] [address]      Joanie Yeboah MD   [facility] 400

## 2019-05-15 NOTE — PATIENT INSTRUCTIONS
1. You were seen in the ENT Clinic today by Dr. Wynne.  If you have any questions or concerns after your appointment, please call   - Option 1: ENT Clinic: [phone]  - Option 2: Harjit (Dr. Wynne's Nurse): [phone]    2. Plan to return to clinic in September following your CT scans. Make sure to take prescribed medications prior to your CT scans as you have a contrast allergy; these medication have been sent to you pharmacy     Natice Schwab, RN  [facility] Otolaryngology  [phone]

## 2019-05-15 NOTE — NURSING NOTE
"Chief Complaint   Patient presents with     RECHECK     follow up      Blood pressure 129/87, pulse 80, temperature 98.1  F (36.7  C), resp. rate 17, height 1.65 m (5' 4.96\"), weight 65.8 kg (145 lb).    Zander Beckford LPN    "

## 2019-05-28 ENCOUNTER — ONCOLOGY VISIT (OUTPATIENT)
Dept: ONCOLOGY | Facility: CLINIC | Age: 79
End: 2019-05-28
Attending: INTERNAL MEDICINE
Payer: COMMERCIAL

## 2019-05-28 VITALS
OXYGEN SATURATION: 95 % | TEMPERATURE: 98.2 F | WEIGHT: 143.6 LBS | DIASTOLIC BLOOD PRESSURE: 70 MMHG | SYSTOLIC BLOOD PRESSURE: 117 MMHG | HEART RATE: 85 BPM | BODY MASS INDEX: 23.92 KG/M2

## 2019-05-28 DIAGNOSIS — C13.9 SQUAMOUS CELL CANCER OF HYPOPHARYNX (H): ICD-10-CM

## 2019-05-28 DIAGNOSIS — E03.9 HYPOTHYROIDISM, UNSPECIFIED TYPE: ICD-10-CM

## 2019-05-28 DIAGNOSIS — C13.9 SQUAMOUS CELL CANCER OF HYPOPHARYNX (H): Primary | ICD-10-CM

## 2019-05-28 LAB
BASOPHILS # BLD AUTO: 0 10E9/L (ref 0–0.2)
BASOPHILS NFR BLD AUTO: 0.5 %
DIFFERENTIAL METHOD BLD: ABNORMAL
EOSINOPHIL # BLD AUTO: 0.1 10E9/L (ref 0–0.7)
EOSINOPHIL NFR BLD AUTO: 1.7 %
ERYTHROCYTE [DISTWIDTH] IN BLOOD BY AUTOMATED COUNT: 13.2 % (ref 10–15)
HCT VFR BLD AUTO: 38.1 % (ref 40–53)
HGB BLD-MCNC: 12.8 G/DL (ref 13.3–17.7)
IMM GRANULOCYTES # BLD: 0.1 10E9/L (ref 0–0.4)
IMM GRANULOCYTES NFR BLD: 1 %
LYMPHOCYTES # BLD AUTO: 1.1 10E9/L (ref 0.8–5.3)
LYMPHOCYTES NFR BLD AUTO: 18.6 %
MCH RBC QN AUTO: 31.1 PG (ref 26.5–33)
MCHC RBC AUTO-ENTMCNC: 33.6 G/DL (ref 31.5–36.5)
MCV RBC AUTO: 93 FL (ref 78–100)
MONOCYTES # BLD AUTO: 0.7 10E9/L (ref 0–1.3)
MONOCYTES NFR BLD AUTO: 11.9 %
NEUTROPHILS # BLD AUTO: 4 10E9/L (ref 1.6–8.3)
NEUTROPHILS NFR BLD AUTO: 66.3 %
NRBC # BLD AUTO: 0 10*3/UL
NRBC BLD AUTO-RTO: 0 /100
PLATELET # BLD AUTO: 129 10E9/L (ref 150–450)
RBC # BLD AUTO: 4.11 10E12/L (ref 4.4–5.9)
T4 FREE SERPL-MCNC: 1 NG/DL (ref 0.76–1.46)
TSH SERPL DL<=0.005 MIU/L-ACNC: 6.3 MU/L (ref 0.4–4)
WBC # BLD AUTO: 6 10E9/L (ref 4–11)

## 2019-05-28 PROCEDURE — 84439 ASSAY OF FREE THYROXINE: CPT | Performed by: INTERNAL MEDICINE

## 2019-05-28 PROCEDURE — 84443 ASSAY THYROID STIM HORMONE: CPT | Performed by: INTERNAL MEDICINE

## 2019-05-28 PROCEDURE — 36415 COLL VENOUS BLD VENIPUNCTURE: CPT

## 2019-05-28 PROCEDURE — G0463 HOSPITAL OUTPT CLINIC VISIT: HCPCS | Mod: ZF

## 2019-05-28 PROCEDURE — 85025 COMPLETE CBC W/AUTO DIFF WBC: CPT | Performed by: INTERNAL MEDICINE

## 2019-05-28 PROCEDURE — 99214 OFFICE O/P EST MOD 30 MIN: CPT | Mod: ZP | Performed by: INTERNAL MEDICINE

## 2019-05-28 ASSESSMENT — PAIN SCALES - GENERAL: PAINLEVEL: NO PAIN (0)

## 2019-05-28 NOTE — PROGRESS NOTES
REASON FOR VISIT:    CANCER STAGE: Cancer Staging  Squamous cell cancer of hypopharynx (H)  Staging form: Pharynx - Hypopharynx, AJCC 7th Edition  - Clinical: Stage NORAH (T4a, N1, M0) - Signed by Derian Blair MD on 3/31/2015        HISTORY OF PRESENT ILLNESS:  5 year old gentleman originally from [facility].  -At the beginning of 2015 he developed left-sided odynophagia. He saw Dr. Cailin Rivera in ENT in [place], who performed a flexible endoscopy which revealed a lesion in the left false vocal cord and postcricoid region.   - 03/18/15 diagnosed with SCC (keratinizing type with ulcerstion) by Left supraglottic bx   - There was submucosal edema and fullness with tumor at the left arytenoid and aryepiglottic fold extending into the left piriform sinus. There was an exophytic lesion visualized in the base of the piriform sinus and extending up to the lateral left postcricoid region.   - 03/24/2015 PET/CT showed 15 mm hypermetabolic mass in the left piriform sinus and 16 mm hypermetabolic left level IIA lymph node. No distant mets. There was nonspecific focal FDG uptake in the hilar regions without gross segundo formation, however evaluation on CT is partially limited given lack of IV contrast.   - Stage was NORAH ([id number])  - 4/15/15 started chemoradiation with HD Cisplatin. He was switched to weekly cisplatin with 40 mg /m2 on 5/27/15 for worsening hearing loss and tinnitus.   - 6/3/2015 completed therapy  - 8/27/15 PET scan showed a residual activity in the neck LN. Also there were multiple concerning hilar LN.   - 9/9/15 left modified radical neck dissection. Pathology negative for viable tumor  -scans in 6/2016 show no signs of recurrence on CT chest and CT neck.     Chema returns in follow up. He is overall doing well. He returned to [place] from his annual winter in [facility] and he promptly developed a pneumonia.  He was treated with a Z joanna and didn't improve and then was given a 2nd  course of antibiotic and now is feeling better.  He still has a little cough, and a slight amount of shortness of breath with exertion, but very mild.  He is otherwise eating and drinking well. He has some dry mouth, but is tolerating a diet quite well. He otherwise has no complaints.     Review Of Systems  10-point review of systems were negative except as noted in HPI.        EXAM:  There were no vitals taken for this visit.  GEN: alert and oriented x 3, nad  HEENT: perrla, eomi, sclera anicteric, oral mucosa moist without thrush  NECK: supple, no palpable LAD  HT: reg rate and rhythm, no murmurs  LUNGS: clear to auscultation bilaterally  ABD: soft, nt, nd, +bs x 4  EXT: no clubbing, cyanosis, or edema  NEURO: CN 2-12 intact, MS 5/5 b/l    Current Outpatient Medications   Medication Sig Dispense Refill     acetaminophen (TYLENOL) 500 MG tablet Take 1,000 mg by mouth       docusate calcium (SURFAK) 240 MG capsule Take 240 mg by mouth       ibuprofen (ADVIL/MOTRIN) 200 MG tablet Take 200-400 mg by mouth       methylPREDNISolone (MEDROL) 32 MG tablet 1 tablet PO 12 hours before CT and 1 tablet PO 2 hours before CT 2 tablet 0     methylPREDNISolone (MEDROL) 32 MG tablet Take one tablet at 9PM tonight 9-26, take a second tablet at 7AM on 9-27 2 tablet 0     multivitamin, therapeutic with minerals (MULTI-VITAMIN) TABS Take 1 tablet by mouth daily       pilocarpine (SALAGEN) 5 MG tablet Take 1 tablet (5 mg) by mouth 3 times daily 90 tablet 3     PILOCARPINE HCL PO        polyethylene glycol (MIRALAX/GLYCOLAX) Packet Take 17 g by mouth             Recent Labs   Lab Test 09/05/18  1030   WBC 7.8   HGB 15.3        @labrcent[na,potassium,chloride,co2,bun,cr@  Recent Labs   Lab Test 09/05/18  1030   PROTTOTAL 7.6   ALBUMIN 4.0   BILITOTAL 0.7   AST 16   ALT 20   ALKPHOS 54         No results found for this or any previous visit (from the past 744 hour(s)).        Assessment/Plan  Stage Faraz hypopharynx squamous cell ca  - He is 4 years out from completion of therapy. He has ENRIQUE currently by exam and symptoms.  He is due for a surveillance CT in Sept later this year. He is going to see Dr. Wynne at that time.  I will simply follow up with him next May or June and get his 5 year CT at that time.  If something comes up earlier on his scan in September, we will of course see him sooner.     TSH - pending today.  It had been slowly climbing. He currently does not have symptoms of hypothyroidism, but we will follow up with him on these results.     Pneumonia - He is recovering from pneumonia - has been on antibiotics for 12 days and is now about a week after that. He is feeling better.   I spent 30 minutes with the patient.  >50% of the time was spent in counseling and coordination of care.    Navarro Willingham   of Medicine  Division of Hematology, Oncology, and Transplantation

## 2019-05-28 NOTE — NURSING NOTE
"Oncology Rooming Note    May 28, 2019 3:18 PM   Chema Morales is a 79 year old male who presents for:    Chief Complaint   Patient presents with     Blood Draw     vitals and blood drawn by LPN. Pt checked into appt.     Oncology Clinic Visit     Hypopharyngeal      Initial Vitals: /70   Pulse 85   Temp 98.2  F (36.8  C) (Oral)   Wt 65.1 kg (143 lb 9.6 oz)   SpO2 95%   BMI 23.92 kg/m   Estimated body mass index is 23.92 kg/m  as calculated from the following:    Height as of 5/15/19: 1.65 m (5' 4.96\").    Weight as of this encounter: 65.1 kg (143 lb 9.6 oz). Body surface area is 1.73 meters squared.  No Pain (0) Comment: Data Unavailable   No LMP for male patient.  Allergies reviewed: Yes  Medications reviewed: Yes    Medications: Medication refills not needed today.  Pharmacy name entered into [employer]:    [facility] [place], [place] - [address] [place] [address] [phone]  [facility] [address] [place] [address]    Clinical concerns: no  Willingham was notified.      Jemma Butler MA              "

## 2019-05-28 NOTE — PROGRESS NOTES
Chief Complaint   Patient presents with     Blood Draw     vitals and blood drawn by LPN. Pt checked into evet.     OLIVIER Hawthorne LPN

## 2019-05-28 NOTE — LETTER
5/28/2019       RE: Chema Morales  [address]  [facility] [address]     Dear Colleague,    Thank you for referring your patient, Chema Morales, to the [facility]. Please see a copy of my visit note below.    REASON FOR VISIT:    CANCER STAGE: Cancer Staging  Squamous cell cancer of hypopharynx (H)  Staging form: Pharynx - Hypopharynx, AJCC 7th Edition  - Clinical: Stage NORAH (T4a, N1, M0) - Signed by Derian Blair MD on 3/31/2015        HISTORY OF PRESENT ILLNESS:  5 year old gentleman originally from [facility].  -At the beginning of 2015 he developed left-sided odynophagia. He saw Dr. Cailin Rivera in ENT in [place], who performed a flexible endoscopy which revealed a lesion in the left false vocal cord and postcricoid region.   - 03/18/15 diagnosed with SCC (keratinizing type with ulcerstion) by Left supraglottic bx   - There was submucosal edema and fullness with tumor at the left arytenoid and aryepiglottic fold extending into the left piriform sinus. There was an exophytic lesion visualized in the base of the piriform sinus and extending up to the lateral left postcricoid region.   - 03/24/2015 PET/CT showed 15 mm hypermetabolic mass in the left piriform sinus and 16 mm hypermetabolic left level IIA lymph node. No distant mets. There was nonspecific focal FDG uptake in the hilar regions without gross segundo formation, however evaluation on CT is partially limited given lack of IV contrast.   - Stage was NORAH ([id number])  - 4/15/15 started chemoradiation with HD Cisplatin. He was switched to weekly cisplatin with 40 mg /m2 on 5/27/15 for worsening hearing loss and tinnitus.   - 6/3/2015 completed therapy  - 8/27/15 PET scan showed a residual activity in the neck LN. Also there were multiple concerning hilar LN.   - 9/9/15 left modified radical neck dissection. Pathology negative for viable tumor  -scans in 6/2016 show no signs of recurrence on CT  chest and CT neck.     Chema returns in follow up. He is overall doing well. He returned to [place] from his annual winter in [place] and he promptly developed a pneumonia.  He was treated with a Z joanna and didn't improve and then was given a 2nd course of antibiotic and now is feeling better.  He still has a little cough, and a slight amount of shortness of breath with exertion, but very mild.  He is otherwise eating and drinking well. He has some dry mouth, but is tolerating a diet quite well. He otherwise has no complaints.     Review Of Systems  10-point review of systems were negative except as noted in HPI.        EXAM:  There were no vitals taken for this visit.  GEN: alert and oriented x 3, nad  HEENT: perrla, eomi, sclera anicteric, oral mucosa moist without thrush  NECK: supple, no palpable LAD  HT: reg rate and rhythm, no murmurs  LUNGS: clear to auscultation bilaterally  ABD: soft, nt, nd, +bs x 4  EXT: no clubbing, cyanosis, or edema  NEURO: CN 2-12 intact, MS 5/5 b/l    Current Outpatient Medications   Medication Sig Dispense Refill     acetaminophen (TYLENOL) 500 MG tablet Take 1,000 mg by mouth       docusate calcium (SURFAK) 240 MG capsule Take 240 mg by mouth       ibuprofen (ADVIL/MOTRIN) 200 MG tablet Take 200-400 mg by mouth       methylPREDNISolone (MEDROL) 32 MG tablet 1 tablet PO 12 hours before CT and 1 tablet PO 2 hours before CT 2 tablet 0     methylPREDNISolone (MEDROL) 32 MG tablet Take one tablet at 9PM tonight 9-26, take a second tablet at 7AM on 9-27 2 tablet 0     multivitamin, therapeutic with minerals (MULTI-VITAMIN) TABS Take 1 tablet by mouth daily       pilocarpine (SALAGEN) 5 MG tablet Take 1 tablet (5 mg) by mouth 3 times daily 90 tablet 3     PILOCARPINE HCL PO        polyethylene glycol (MIRALAX/GLYCOLAX) Packet Take 17 g by mouth             Recent Labs   Lab Test 09/05/18  1030   WBC 7.8   HGB 15.3        @labrcent[na,potassium,chloride,co2,bun,cr@  Recent Labs    Lab Test 09/05/18  1030   PROTTOTAL 7.6   ALBUMIN 4.0   BILITOTAL 0.7   AST 16   ALT 20   ALKPHOS 54         No results found for this or any previous visit (from the past 744 hour(s)).        Assessment/Plan  Stage Faraz hypopharynx squamous cell ca - He is 4 years out from completion of therapy. He has ENRIQUE currently by exam and symptoms.  He is due for a surveillance CT in Sept later this year. He is going to see Dr. Wynne at that time.  I will simply follow up with him next May or June and get his 5 year CT at that time.  If something comes up earlier on his scan in September, we will of course see him sooner.     TSH - pending today.  It had been slowly climbing. He currently does not have symptoms of hypothyroidism, but we will follow up with him on these results.     Pneumonia - He is recovering from pneumonia - has been on antibiotics for 12 days and is now about a week after that. He is feeling better.   I spent 30 minutes with the patient.  >50% of the time was spent in counseling and coordination of care.    Navarro Willingham   of Medicine  Division of Hematology, Oncology, and Transplantation    Chief Complaint   Patient presents with     Blood Draw     vitals and blood drawn by LPN. Pt checked into appt.     OLIVIER Hawthorne LPN    Again, thank you for allowing me to participate in the care of your patient.      Sincerely,    Navarro Willingham, DO

## 2019-06-26 DIAGNOSIS — C13.9 SQUAMOUS CELL CANCER OF HYPOPHARYNX (H): Primary | ICD-10-CM

## 2019-09-11 ENCOUNTER — ALLIED HEALTH/NURSE VISIT (OUTPATIENT)
Dept: SPEECH THERAPY | Facility: CLINIC | Age: 79
End: 2019-09-11

## 2019-09-11 ENCOUNTER — OFFICE VISIT (OUTPATIENT)
Dept: OTOLARYNGOLOGY | Facility: CLINIC | Age: 79
End: 2019-09-11
Payer: COMMERCIAL

## 2019-09-11 ENCOUNTER — ANCILLARY PROCEDURE (OUTPATIENT)
Dept: CT IMAGING | Facility: CLINIC | Age: 79
End: 2019-09-11
Attending: OTOLARYNGOLOGY
Payer: COMMERCIAL

## 2019-09-11 VITALS
WEIGHT: 140 LBS | DIASTOLIC BLOOD PRESSURE: 73 MMHG | SYSTOLIC BLOOD PRESSURE: 132 MMHG | BODY MASS INDEX: 23.33 KG/M2 | HEART RATE: 85 BPM

## 2019-09-11 DIAGNOSIS — R13.12 OROPHARYNGEAL DYSPHAGIA: ICD-10-CM

## 2019-09-11 DIAGNOSIS — C12 MALIGNANT NEOPLASM OF PYRIFORM SINUS (H): ICD-10-CM

## 2019-09-11 DIAGNOSIS — R49.0 HOARSENESS: ICD-10-CM

## 2019-09-11 DIAGNOSIS — C12 MALIGNANT NEOPLASM OF PYRIFORM SINUS (H): Primary | ICD-10-CM

## 2019-09-11 LAB
CREAT BLD-MCNC: 0.8 MG/DL (ref 0.66–1.25)
GFR SERPL CREATININE-BSD FRML MDRD: >90 ML/MIN/{1.73_M2}

## 2019-09-11 RX ORDER — IOPAMIDOL 755 MG/ML
70 INJECTION, SOLUTION INTRAVASCULAR ONCE
Status: COMPLETED | OUTPATIENT
Start: 2019-09-11 | End: 2019-09-11

## 2019-09-11 RX ORDER — CETIRIZINE HYDROCHLORIDE 10 MG/1
10 TABLET ORAL DAILY
COMMUNITY

## 2019-09-11 RX ADMIN — IOPAMIDOL 70 ML: 755 INJECTION, SOLUTION INTRAVASCULAR at 08:20

## 2019-09-11 ASSESSMENT — PAIN SCALES - GENERAL: PAINLEVEL: NO PAIN (0)

## 2019-09-11 NOTE — PATIENT INSTRUCTIONS
Chema Morales,    It was a pleasure to see you today.    1. You were seen in the ENT Clinic today by Michelle Blanco PA-C    If you have any questions or concerns after your appointment, please call   - Option 1: ENT Clinic: [phone]  - Option 2: Harjit (Dr. Wynne's Nurse): [phone]      2. Please continue the swallowing exercises and neck/arm stretches daily.    3. Please return to see me in April or May 2020, once you get back from [facility].    The plan will be to see you and scope you then.  We would then see you again in September 2020, with scans prior to that.  That will be the 5 year time frame.  We will discuss the next steps then.    4.  I will call you with the CT scans tomorrow when they are back.      Thank you,  Michelle Blanco PA-C  Otolaryngology  Head & Neck Surgery  [phone]

## 2019-09-11 NOTE — NURSING NOTE
Chief Complaint   Patient presents with     RECHECK     Follow-up, CT prior     Blood pressure 132/73, pulse 85, weight 63.5 kg (140 lb).    Marcella Mccray EMT

## 2019-09-11 NOTE — LETTER
9/11/2019       RE: Chema Morales  [address]  [facility] [address]     Dear Colleague,    Thank you for referring your patient, Chema Morales, to the [facility] EAR NOSE AND THROAT at [facility]. Please see a copy of my visit note below.    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  He did quite well, but a residual node was noted at the three-month PET CT scan, and he underwent a neck dissection on 09/09/2015.  Pathology from the neck dissection on September 9 shows 1 out of 3 nodes being positive and that node containing nonviable tumor.         HPI:  Mr. Morales is now 4 years out from completion of his treatment of therapy.  He reports that overall, he has been doing well.  The biggest thing he has had going on in the last few months is 2 episodes of pneumonia, which they feel is aspiration pneumonia.  He is been working with a speech therapist at [facility].  It is a swallow study which showed deep penetration of thin liquids.  He has been doing some swallowing exercises and then had a repeat swallow which looked better.  He continues to do the swallow exercises on a daily basis and has noticed less coughing with liquids.  He did not seem to really have trouble with foods.    He does continue to have some chronic hoarseness of voice and some intermittent episodes of reflux, but nothing unusual or different than he has had in the last 4 years.  He continues to use pilocarpine for his dry mouth.    The patient denies any referred otalgia, odynophagia, dysphagia, dizziness/lightheadedness, loss of balance, facial paresthesias,  tinnitus, hemoptysis, other pain, bleeding, and recurrent/new lumps or bumps. Weight is stable.          Physical Exam:  /73 (BP Location: Left arm, Patient Position: Chair, Cuff Size: Adult Regular)   Pulse 85   Wt 63.5 kg (140 lb)   BMI 23.33 kg/m       Constitutional: The patient is accompanied, well-groomed, and in no acute distress.    Head: Normocephalic and atraumatic.   Eyes: Pupils were equal and reactive.  Extraocular movement intact.    Ears: Pinnae and tragus non-tender.  EACs and TMs were clear.     Nose: Sinuses were non-tender.  Anterior rhinoscopy revealed midline septum and absence of purulence or polyps.    Oral Cavity: Normal tongue, floor of mouth, buccal mucosa, and palate.  No lesions or masses on inspection or palpation. Loose tooth 25    Oropharynx: Normal mucosa, palate symmetric with normal elevation. No abnormal lymph tissue in the oropharynx.  Pterygoid region non-tender.   Hypopharynx/Larynx: Deferred for fiberoptic endoscopic exam  Neck: Supple with normal laryngeal and tracheal landmarks.  The parotid beds were without masses.  No palpable thyroid.  Normal range of motion  Lymphatic: There is no palpable lymphadenopathy in the neck.   Skin: Normal:  warm and pink without rash  Neurologic: Alert and oriented x 3.  CN's III-XII within normal limits.  Voice hoarse.   Communication: Normal; communicates verbally, voice hoarse      Fiberoptic Endoscopy:  Consent for fiberoptic laryngoscopy was obtained, and we confirmed correctness of procedure and identity of patient.  Fiberoptic laryngoscopy was indicated due to hx of L piriform sinus SCC and hoarseness of voice.  The nose was topically decongested and anesthetized.  The fiberoptic laryngoscope was passed under endoscopic vision.  The turbinates were normal.  The inferior and middle meati were clear bilaterally without purulence, masses, or polyps.  The nasopharynx was clear.  The Eustachian tubes were clear.  The soft palate  appeared normal with good mobility.  The epiglottis was sharp and the visualized portion of the vallecula was clear.  The larynx was clear with mobile cords.  The arytenoids were clear and there was no pooling in the hypopharynx.  No evidence of penetration or aspiration on FEES with SLP.       Video of endoscopy (please click on image above to watch)      IMPRESSION AND PLAN:    1. 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  Patient is off for years out.  Doing well.  Had CT scans today.  I will call him with results.    No concerning findings at this time.  [facility] [place] [facility]  He will return in April or May 2020 for recheck and then we will plan to have him come back again in September 2020, at his 5-year timeframe, and get imaging studies at that time.     2. Dysphagia.  Patient with ongoing issues with dysphagia.  Also had some aspiration pneumonia x2 in the last 4 months.  Is working with speech-language pathology through [facility].  Has had a swallow study that initially showed some deep penetration of thin liquids.  Repeat study showed improvement.  Patient is in a continue to do the swallow exercises per the SLP at [facility].    Patient was also evaluated by Karen Montgomery, MS SLP-[facility], today in clinic. Refer to her note for further recommendations and plan of care.    He will continue the exercises daily.    3. Hoarseness of voice.  Chronic change since radiation therapy.  Nothing acute on the vocal cords today.  Patient does occasionally get worse symptoms with reflux so he will treat the reflux on an as-needed basis.    Michelle Blanco PA-C  Otolaryngology  Head & Neck Surgery  [phone]     CC:  Boone Wynne MD  Otolaryngology/Head & Neck Surgery  [facility] [address]    Delfina Rhodes MD    Otolaryngology Clinic   [facility]             Pawan Morrissey MD   [address], [place] [address]        Joanie Yeboah MD   [facility] [address]

## 2019-09-11 NOTE — PROGRESS NOTES
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  He did quite well, but a residual node was noted at the three-month PET CT scan, and he underwent a neck dissection on 09/09/2015.  Pathology from the neck dissection on September 9 shows 1 out of 3 nodes being positive and that node containing nonviable tumor.         HPI:  Mr. Morales is now 4 years out from completion of his treatment of therapy.  He reports that overall, he has been doing well.  The biggest thing he has had going on in the last few months is 2 episodes of pneumonia, which they feel is aspiration pneumonia.  He is been working with a speech therapist at [facility].  It is a swallow study which showed deep penetration of thin liquids.  He has been doing some swallowing exercises and then had a repeat swallow which looked better.  He continues to do the swallow exercises on a daily basis and has noticed less coughing with liquids.  He did not seem to really have trouble with foods.    He does continue to have some chronic hoarseness of voice and some intermittent episodes of reflux, but nothing unusual or different than he has had in the last 4 years.  He continues to use pilocarpine for his dry mouth.    The patient denies any referred otalgia, odynophagia, dysphagia, dizziness/lightheadedness, loss of balance, facial paresthesias, tinnitus, hemoptysis, other pain, bleeding, and recurrent/new lumps or bumps. Weight is stable.          Physical Exam:  /73 (BP Location: Left arm, Patient Position: Chair, Cuff Size: Adult Regular)   Pulse 85   Wt 63.5 kg (140 lb)   BMI 23.33 kg/m      Constitutional: The patient is  accompanied, well-groomed, and in no acute distress.    Head: Normocephalic and atraumatic.   Eyes: Pupils were equal and reactive.  Extraocular movement intact.    Ears: Pinnae and tragus non-tender.  EACs and TMs were clear.     Nose: Sinuses were non-tender.  Anterior rhinoscopy revealed midline septum and absence of purulence or polyps.    Oral Cavity: Normal tongue, floor of mouth, buccal mucosa, and palate.  No lesions or masses on inspection or palpation. Loose tooth 25    Oropharynx: Normal mucosa, palate symmetric with normal elevation. No abnormal lymph tissue in the oropharynx.  Pterygoid region non-tender.   Hypopharynx/Larynx: Deferred for fiberoptic endoscopic exam  Neck: Supple with normal laryngeal and tracheal landmarks.  The parotid beds were without masses.  No palpable thyroid.  Normal range of motion  Lymphatic: There is no palpable lymphadenopathy in the neck.   Skin: Normal:  warm and pink without rash  Neurologic: Alert and oriented x 3.  CN's III-XII within normal limits.  Voice hoarse.   Communication: Normal; communicates verbally, voice hoarse      Fiberoptic Endoscopy:  Consent for fiberoptic laryngoscopy was obtained, and we confirmed correctness of procedure and identity of patient.  Fiberoptic laryngoscopy was indicated due to hx of L piriform sinus SCC and hoarseness of voice.  The nose was topically decongested and anesthetized.  The fiberoptic laryngoscope was passed under endoscopic vision.  The turbinates were normal.  The inferior and middle meati were clear bilaterally without purulence, masses, or polyps.  The nasopharynx was clear.  The Eustachian tubes were clear.  The soft palate appeared normal with good mobility.  The epiglottis was sharp and the visualized portion of the vallecula was clear.  The larynx was clear with mobile cords.  The arytenoids were clear and there was no pooling in the hypopharynx.  No evidence of penetration or aspiration on FEES with  SLP.       Video of endoscopy (please click on image above to watch)      IMPRESSION AND PLAN:    1. 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  Patient is off for years out.  Doing well.  Had CT scans today.  I will call him with results.    No concerning findings at this time.  The patient will be going to [facility] for the winter.  He will return in April or May 2020 for recheck and then we will plan to have him come back again in September 2020, at his 5-year timeframe, and get imaging studies at that time.     2. Dysphagia.  Patient with ongoing issues with dysphagia.  Also had some aspiration pneumonia x2 in the last 4 months.  Is working with [facility]language pathology [facility].  Has had a swallow study that initially showed some deep penetration of thin liquids.  Repeat study showed improvement.  Patient is in a continue to do the swallow exercises per the SLP at [facility].    Patient was also evaluated by Karen Montogmery, MS SLP-[facility], today in clinic. Refer to her note for further recommendations and plan of care.    He will continue the exercises daily.    3. Hoarseness of voice.  Chronic change since radiation therapy.  Nothing acute on the vocal cords today.  Patient does occasionally get worse symptoms with reflux so he will treat the reflux on an as-needed basis.        Addendum   CT chest:   1. No evidence of metastatic disease in the chest.  2. Mild to moderate centrilobular emphysema.    CT neck still in process.  Patient's son called about chest CT. Will check tomorrow and then call patient's son with results.  Michelle Blanco PA-C, 9/12/2019, 1:21 PM      Addendum   Neck CT:  IMPRESSION: Postradiation and postsurgical changes in the neck. 8 mm  nodularity along the laryngeal surface of the epiglottis, left of the  midline. This could be due to volume averaging from the tip of the  uvula however a new lesion cannot be excluded. Correlation with  clinical exam  findings is recommended.     Reviewed this scan in Tumor Board with Dr. Wynne and Dr. Harper.  We reviewed the fiberoptic endoscopy together.  No Evidence of lesion on endoscopy.  We felt the finding was due to the uvula lying under the epiglottis, based on the endoscopy.    This was communicated to the patient's son today via Telephone and released to [facility].  Michelle Blanco PA-C, 9/13/2019, 1:41 PM          Michelle Blanco PA-C  Otolaryngology  Head & Neck Surgery  [phone]         CC:  Boone Wynne MD  Otolaryngology/Head & Neck Surgery  [facility] [address]    Delfina Rhodes MD    Otolaryngology Clinic   [facility] [address]             Pawan Morrissey MD   [address], [place] [address]       Joanie Yeboah MD   [facility] [address]

## 2019-09-11 NOTE — PROGRESS NOTES
Patient seen for allied health care provider visit while in to see Michelle Blanco PA-C. He reported aspiration pneumonia x2 and is currently working with SLP at [facility]. He was able to verbalize exercises and upon FEES screening does not demonstrate aspiration. He has a small amount of residual in the valleculae which was cleared with subsequent swallow. He will continue to work with his SLP to maximize swallow function. Recommended he continue to complete his exercises for the remainder of his life to optimize his swallow function. Time spent with patient 6 minutes.

## 2020-01-10 ENCOUNTER — TELEPHONE (OUTPATIENT)
Dept: OTOLARYNGOLOGY | Facility: CLINIC | Age: 80
End: 2020-01-10

## 2020-01-10 NOTE — TELEPHONE ENCOUNTER
LVM w/ pt informing him that appointment on 5/26 will now start at 11am. Provided ENT call center number to call back with any questions; will send letter. -CR

## 2020-03-10 ENCOUNTER — HEALTH MAINTENANCE LETTER (OUTPATIENT)
Age: 80
End: 2020-03-10

## 2020-09-16 ENCOUNTER — MYC REFILL (OUTPATIENT)
Dept: OTOLARYNGOLOGY | Facility: CLINIC | Age: 80
End: 2020-09-16

## 2020-09-16 DIAGNOSIS — C12 MALIGNANT NEOPLASM OF PYRIFORM SINUS (H): ICD-10-CM

## 2020-09-16 DIAGNOSIS — Z91.041 CONTRAST MEDIA ALLERGY: ICD-10-CM

## 2020-09-16 RX ORDER — METHYLPREDNISOLONE 32 MG/1
TABLET ORAL
Qty: 2 TABLET | Refills: 0 | Status: SHIPPED | OUTPATIENT
Start: 2020-09-16

## 2020-09-16 NOTE — TELEPHONE ENCOUNTER
Received call form Pt son. Request routed to ENT as they previously ordered, but imaging order is under Dr Willingham. Spoke with Camila Torres RNCC and confirmed the intention to refill med. Signing. Called and relayed information to Pt's son, who verbalized understanding.

## 2020-09-18 ENCOUNTER — ANCILLARY PROCEDURE (OUTPATIENT)
Dept: CT IMAGING | Facility: CLINIC | Age: 80
End: 2020-09-18
Attending: INTERNAL MEDICINE
Payer: COMMERCIAL

## 2020-09-18 ENCOUNTER — VIRTUAL VISIT (OUTPATIENT)
Dept: ONCOLOGY | Facility: CLINIC | Age: 80
End: 2020-09-18
Attending: INTERNAL MEDICINE
Payer: COMMERCIAL

## 2020-09-18 VITALS
HEART RATE: 84 BPM | OXYGEN SATURATION: 97 % | SYSTOLIC BLOOD PRESSURE: 134 MMHG | RESPIRATION RATE: 14 BRPM | WEIGHT: 151.9 LBS | TEMPERATURE: 98 F | BODY MASS INDEX: 25.31 KG/M2 | DIASTOLIC BLOOD PRESSURE: 76 MMHG

## 2020-09-18 DIAGNOSIS — C13.9 SQUAMOUS CELL CANCER OF HYPOPHARYNX (H): ICD-10-CM

## 2020-09-18 DIAGNOSIS — C13.9 SQUAMOUS CELL CANCER OF HYPOPHARYNX (H): Primary | ICD-10-CM

## 2020-09-18 LAB
ALBUMIN SERPL-MCNC: 3.9 G/DL (ref 3.4–5)
ALP SERPL-CCNC: 49 U/L (ref 40–150)
ALT SERPL W P-5'-P-CCNC: 25 U/L (ref 0–70)
ANION GAP SERPL CALCULATED.3IONS-SCNC: 8 MMOL/L (ref 3–14)
AST SERPL W P-5'-P-CCNC: 17 U/L (ref 0–45)
BASOPHILS # BLD AUTO: 0 10E9/L (ref 0–0.2)
BASOPHILS NFR BLD AUTO: 0.1 %
BILIRUB SERPL-MCNC: 0.6 MG/DL (ref 0.2–1.3)
BUN SERPL-MCNC: 16 MG/DL (ref 7–30)
CALCIUM SERPL-MCNC: 9 MG/DL (ref 8.5–10.1)
CHLORIDE SERPL-SCNC: 102 MMOL/L (ref 94–109)
CO2 SERPL-SCNC: 25 MMOL/L (ref 20–32)
CREAT SERPL-MCNC: 0.75 MG/DL (ref 0.66–1.25)
DIFFERENTIAL METHOD BLD: NORMAL
EOSINOPHIL # BLD AUTO: 0 10E9/L (ref 0–0.7)
EOSINOPHIL NFR BLD AUTO: 0 %
ERYTHROCYTE [DISTWIDTH] IN BLOOD BY AUTOMATED COUNT: 13.4 % (ref 10–15)
GFR SERPL CREATININE-BSD FRML MDRD: 87 ML/MIN/{1.73_M2}
GLUCOSE SERPL-MCNC: 114 MG/DL (ref 70–99)
HCT VFR BLD AUTO: 43.7 % (ref 40–53)
HGB BLD-MCNC: 15.3 G/DL (ref 13.3–17.7)
IMM GRANULOCYTES # BLD: 0 10E9/L (ref 0–0.4)
IMM GRANULOCYTES NFR BLD: 0.4 %
LYMPHOCYTES # BLD AUTO: 0.8 10E9/L (ref 0.8–5.3)
LYMPHOCYTES NFR BLD AUTO: 10.4 %
MCH RBC QN AUTO: 32.3 PG (ref 26.5–33)
MCHC RBC AUTO-ENTMCNC: 35 G/DL (ref 31.5–36.5)
MCV RBC AUTO: 92 FL (ref 78–100)
MONOCYTES # BLD AUTO: 0.3 10E9/L (ref 0–1.3)
MONOCYTES NFR BLD AUTO: 3.5 %
NEUTROPHILS # BLD AUTO: 6.8 10E9/L (ref 1.6–8.3)
NEUTROPHILS NFR BLD AUTO: 85.6 %
NRBC # BLD AUTO: 0 10*3/UL
NRBC BLD AUTO-RTO: 0 /100
PLATELET # BLD AUTO: 156 10E9/L (ref 150–450)
POTASSIUM SERPL-SCNC: 4.2 MMOL/L (ref 3.4–5.3)
PROT SERPL-MCNC: 7.5 G/DL (ref 6.8–8.8)
RBC # BLD AUTO: 4.74 10E12/L (ref 4.4–5.9)
SODIUM SERPL-SCNC: 135 MMOL/L (ref 133–144)
TSH SERPL DL<=0.005 MIU/L-ACNC: 3.81 MU/L (ref 0.4–4)
WBC # BLD AUTO: 7.9 10E9/L (ref 4–11)

## 2020-09-18 PROCEDURE — 84443 ASSAY THYROID STIM HORMONE: CPT | Performed by: INTERNAL MEDICINE

## 2020-09-18 PROCEDURE — 40001009 ZZH VIDEO/TELEPHONE VISIT; NO CHARGE

## 2020-09-18 PROCEDURE — 99214 OFFICE O/P EST MOD 30 MIN: CPT | Mod: 95 | Performed by: INTERNAL MEDICINE

## 2020-09-18 PROCEDURE — 80053 COMPREHEN METABOLIC PANEL: CPT | Performed by: INTERNAL MEDICINE

## 2020-09-18 PROCEDURE — 85025 COMPLETE CBC W/AUTO DIFF WBC: CPT | Performed by: INTERNAL MEDICINE

## 2020-09-18 RX ORDER — IOPAMIDOL 755 MG/ML
74 INJECTION, SOLUTION INTRAVASCULAR ONCE
Status: COMPLETED | OUTPATIENT
Start: 2020-09-18 | End: 2020-09-18

## 2020-09-18 RX ADMIN — IOPAMIDOL 74 ML: 755 INJECTION, SOLUTION INTRAVASCULAR at 13:38

## 2020-09-18 ASSESSMENT — PAIN SCALES - GENERAL: PAINLEVEL: NO PAIN (0)

## 2020-09-18 NOTE — LETTER
"9/18/2020     RE: Chema Morales  [address]  [facility] [address]    Dear Colleague,    Thank you for referring your patient, Chema Morales, to the [facility]. Please see a copy of my visit note below.    Chema Morales is a 80 year old male who is being evaluated via a billable video visit.      The patient has been notified of following:     \"This video visit will be conducted via a call between you and your physician/provider. We have found that certain health care needs can be provided without the need for an in-person physical exam.  This service lets us provide the care you need with a video conversation.  If a prescription is necessary we can send it directly to your pharmacy.  If lab work is needed we can place an order for that and you can then stop by our lab to have the test done at a later time.    Video visits are billed at different rates depending on your insurance coverage.  Please reach out to your insurance provider with any questions.    If during the course of the call the physician/provider feels a video visit is not appropriate, you will not be charged for this service.\"    Patient has given verbal consent for Video visit? Yes    How would you like to obtain your AVS? MyChart     If you are dropped from the video visit, the video invite should be resent to: Text to cell phone: [phone]     Will anyone else be joining your video visit? No          I have reviewed and updated the patient's allergies and pt confirmed any changes to their medication list.  Patient was asked to provide any patient recorded vital signs, height and/or weight.  Please see \"Patient Reported Vital Signs\" tab for that information.  Patient instructed to be in the virtual waiting room 10-15 minutes prior to appointment time.      Concerns: Cough still persisting. His voice has been coming and going.        Refills: None        Rick Linares, EMT          Video-Visit " Details    Type of service:  Video Visit    Video Start Time: 340  Video End Time: 410    Originating Location (pt. Location): Home    Distant Location (provider location):  [facility]     Platform used for Video Visit: Yunior Willingham DO              REASON FOR VISIT:    CANCER STAGE: Cancer Staging  Squamous cell cancer of hypopharynx (H)  Staging form: Pharynx - Hypopharynx, AJCC 7th Edition  - Clinical: Stage NORAH (T4a, N1, M0) - Signed by Derian Blair MD on 3/31/2015        HISTORY OF PRESENT ILLNESS:    At the beginning of 2015 he developed left-sided odynophagia. He saw Dr. Cailin Rivera in ENT in [place], who performed a flexible endoscopy which revealed a lesion in the left false vocal cord and postcricoid region.   - 03/18/15 diagnosed with SCC (keratinizing type with ulcerstion) by Left supraglottic bx   - There was submucosal edema and fullness with tumor at the left arytenoid and aryepiglottic fold extending into the left piriform sinus. There was an exophytic lesion visualized in the base of the piriform sinus and extending up to the lateral left postcricoid region.   - 03/24/2015 PET/CT showed 15 mm hypermetabolic mass in the left piriform sinus and 16 mm hypermetabolic left level IIA lymph node. No distant mets. There was nonspecific focal FDG uptake in the hilar regions without gross segundo formation, however evaluation on CT is partially limited given lack of IV contrast.   - Stage was NORAH ([id number])  - 4/15/15 started chemoradiation with HD Cisplatin. He was switched to weekly cisplatin with 40 mg /m2 on 5/27/15 for worsening hearing loss and tinnitus.   - 6/3/2015 completed therapy  - 8/27/15 PET scan showed a residual activity in the neck LN. Also there were multiple concerning hilar LN.   - 9/9/15 left modified radical neck dissection. Pathology negative for viable tumor  -scans in 6/2016 show no signs of recurrence on CT chest and CT  "jorgeDuong Bear is seeing me via virtual visit today.  This is his 5 year follow up visit, delayed as he was stuck in [place] until 10 days ago due to the coronavirus pandemic.  He typically comes back her in the spring, but was unable to get a flight back since then.  He is doing well. He was able to stay healthy in [facility] in spite of the pandemic. He was [place]' area so was able to social distance.  He states that he overall feels well, but he and his son both notice perhaps a little bit more shortness of breath with exertion.  He notes going up stairs he breathes heavier and maybe he cannot do as much as he likes.  He says this is very mild and he attributes it to old age more than anything.  He otherwise has no other new symptoms.  Last year he was having some aspiration problems, but he feels those are better now - he is taking more time and being diligent with his swallowing exercises and this has been ok. His weight has been stable.  His only other complaint is frequent urination which he thinks is due to enlarged prostate. He has been having difficulty with starting his stream and emptying resulting in urinary frequency.   Review Of Systems  10-point review of systems were negative except as noted in HPI.        EXAM:  Deferred   Alert and oriented x 3, nad, ambulating on video without difficulty    Current Outpatient Medications   Medication Sig Dispense Refill     acetaminophen (TYLENOL) 500 MG tablet Take 1,000 mg by mouth       cetirizine (ZYRTEC) 10 MG tablet Take 10 mg by mouth daily       docusate calcium (SURFAK) 240 MG capsule Take 240 mg by mouth       ibuprofen (ADVIL/MOTRIN) 200 MG tablet Take 200-400 mg by mouth       methylPREDNISolone (MEDROL) 32 MG tablet 1 tablet PO 12 hours before CT and 1 tablet PO 2 hours before CT 2 tablet 0     methylPREDNISolone (MEDROL) 32 MG tablet Take one tablet at 9PM tonight 9-26, take a second tablet at 7AM on 9-27 2 tablet 0     multivitamin, " therapeutic with minerals (MULTI-VITAMIN) TABS Take 1 tablet by mouth daily       pilocarpine (SALAGEN) 5 MG tablet Take 1 tablet (5 mg) by mouth 3 times daily 90 tablet 3     PILOCARPINE HCL PO        polyethylene glycol (MIRALAX/GLYCOLAX) Packet Take 17 g by mouth             Recent Labs   Lab Test 09/18/20  1300   WBC 7.9   HGB 15.3        @labrcent[na,potassium,chloride,co2,bun,cr@  Recent Labs   Lab Test 09/18/20  1300   PROTTOTAL 7.5   ALBUMIN 3.9   BILITOTAL 0.6   AST 17   ALT 25   ALKPHOS 49         No results found for this or any previous visit (from the past 744 hour(s)).        Assessment/Plan  [id number] - squamous cell ca of the hypopharynx - Pt is now more than 5 years out from completion of chemoradiotherapy.  He has no evidence of disease on his scans.  We discussed that he is likely cured.  We can discharge him from oncology clinic.  Though he will need to be followed by primary care.  He does not need any kind of surveillance imaging anymore.  He does have an appt with Dr. Lee in October which I suggested he keep.     Dysphagia - this seems to be improved from last year. He will continue to need to be careful with this.     Thyroid - he will need to check his thyroid level about yearly indefinitely.  He can have this done with his PCP.     Dental - He will need ongoing dental care as a consequence of his prior radiotherapy.     Dyspnea on exertion - I do not see any acute changes on his CT Chest - but he does have some emphysematous changes - I suggested that he discuss with his PCP - regarding getting PFTs and also possibly doing a cardiac stress test.  He will do this - he has an appt with PCP coming up.     I spent 20 minutes with the patient.  >50% of the time was spent in counseling and coordination of care.    Navarro Willingham   of Medicine  Division of Hematology, Oncology, and Transplantation

## 2020-09-18 NOTE — PROGRESS NOTES
"Chema Morales is a 80 year old male who is being evaluated via a billable video visit.      The patient has been notified of following:     \"This video visit will be conducted via a call between you and your physician/provider. We have found that certain health care needs can be provided without the need for an in-person physical exam.  This service lets us provide the care you need with a video conversation.  If a prescription is necessary we can send it directly to your pharmacy.  If lab work is needed we can place an order for that and you can then stop by our lab to have the test done at a later time.    Video visits are billed at different rates depending on your insurance coverage.  Please reach out to your insurance provider with any questions.    If during the course of the call the physician/provider feels a video visit is not appropriate, you will not be charged for this service.\"    Patient has given verbal consent for Video visit? Yes    How would you like to obtain your AVS? MyChart     If you are dropped from the video visit, the video invite should be resent to: Text to cell phone: [phone]     Will anyone else be joining your video visit? No          I have reviewed and updated the patient's allergies and pt confirmed any changes to their medication list.  Patient was asked to provide any patient recorded vital signs, height and/or weight.  Please see \"Patient Reported Vital Signs\" tab for that information.  Patient instructed to be in the virtual waiting room 10-15 minutes prior to appointment time.      Concerns: Cough still persisting. His voice has been coming and going.        Refills: None        KASSIE Edmondson          Video-Visit Details    Type of service:  Video Visit    Video Start Time: 340  Video End Time: 410    Originating Location (pt. Location): Home    Distant Location (provider location):  [facility]ER [facility]     Platform used for Video Visit: " Yunior Willingham DO              REASON FOR VISIT:    CANCER STAGE: Cancer Staging  Squamous cell cancer of hypopharynx (H)  Staging form: Pharynx - Hypopharynx, AJCC 7th Edition  - Clinical: Stage NORAH (T4a, N1, M0) - Signed by Derian Blair MD on 3/31/2015        HISTORY OF PRESENT ILLNESS:    At the beginning of 2015 he developed left-sided odynophagia. He saw Dr. Cailin Rivera in ENT in [place], who performed a flexible endoscopy which revealed a lesion in the left false vocal cord and postcricoid region.   - 03/18/15 diagnosed with SCC (keratinizing type with ulcerstion) by Left supraglottic bx   - There was submucosal edema and fullness with tumor at the left arytenoid and aryepiglottic fold extending into the left piriform sinus. There was an exophytic lesion visualized in the base of the piriform sinus and extending up to the lateral left postcricoid region.   - 03/24/2015 PET/CT showed 15 mm hypermetabolic mass in the left piriform sinus and 16 mm hypermetabolic left level IIA lymph node. No distant mets. There was nonspecific focal FDG uptake in the hilar regions without gross segundo formation, however evaluation on CT is partially limited given lack of IV contrast.   - Stage was NORAH ([id number])  - 4/15/15 started chemoradiation with HD Cisplatin. He was switched to weekly cisplatin with 40 mg /m2 on 5/27/15 for worsening hearing loss and tinnitus.   - 6/3/2015 completed therapy  - 8/27/15 PET scan showed a residual activity in the neck LN. Also there were multiple concerning hilar LN.   - 9/9/15 left modified radical neck dissection. Pathology negative for viable tumor  -scans in 6/2016 show no signs of recurrence on CT chest and CT neck.     Chema is seeing me via virtual visit today.  This is his 5 year follow up visit, delayed [place] [facility] [place] [facility] [place] [facility] [place] [facility] [place] to [place] coronavirus pandemic.  He typically comes back her in the spring, but was unable to get  "a flight back since then.  He is doing well. [facility] was able to [facility] [place] [facility] in [facility] of the pandemic. [place] so was able to social distance.  He states that he overall feels well, but he and his son both notice perhaps a little bit more shortness of breath with exertion.  He notes going up stairs he breathes heavier and maybe he cannot do as much as he likes.  He says this is very mild and he attributes it to old age more than anything.  He otherwise has no other new symptoms.  Last year he was having some aspiration problems, but he feels those are better now - he is taking more time and being diligent with his swallowing exercises and this has been ok. His weight has been stable.  His only other complaint is frequent urination which he thinks is due to enlarged prostate. He has been having difficulty with starting his stream and emptying resulting in urinary frequency.   Review Of Systems  10-point review of systems were negative except as noted in HPI.        EXAM:  Deferred   Alert and oriented x 3, nad, ambulating on video without difficulty    Current Outpatient Medications   Medication Sig Dispense Refill     acetaminophen (TYLENOL) 500 MG tablet Take 1,000 mg by mouth       cetirizine (ZYRTEC) 10 MG tablet Take 10 mg by mouth daily       docusate calcium (SURFAK) 240 MG capsule Take 240 mg by mouth       ibuprofen (ADVIL/MOTRIN) 200 MG tablet Take 200-400 mg by mouth       methylPREDNISolone (MEDROL) 32 MG tablet 1 tablet PO 12 hours before CT and 1 tablet PO 2 hours before CT 2 tablet 0     methylPREDNISolone (MEDROL) 32 MG tablet Take one tablet at 9PM tonight 9-26, take a second tablet at 7AM on 9-27 2 tablet 0     multivitamin, therapeutic with minerals (MULTI-VITAMIN) TABS Take 1 tablet by mouth daily       pilocarpine (SALAGEN) 5 MG tablet Take 1 tablet (5 mg) by mouth 3 times daily 90 tablet 3     PILOCARPINE HCL PO        polyethylene glycol (MIRALAX/GLYCOLAX) " Packet Take 17 g by mouth             Recent Labs   Lab Test 09/18/20  1300   WBC 7.9   HGB 15.3        @labrcent[na,potassium,chloride,co2,bun,cr@  Recent Labs   Lab Test 09/18/20  1300   PROTTOTAL 7.5   ALBUMIN 3.9   BILITOTAL 0.6   AST 17   ALT 25   ALKPHOS 49         No results found for this or any previous visit (from the past 744 hour(s)).        Assessment/Plan  [id number] - squamous cell ca of the hypopharynx - Pt is now more than 5 years out from completion of chemoradiotherapy.  He has no evidence of disease on his scans.  We discussed that he is likely cured.  We can discharge him from oncology clinic.  Though he will need to be followed by primary care.  He does not need any kind of surveillance imaging anymore.  He does have an appt with Dr. Lee in October which I suggested he keep.     Dysphagia - this seems to be improved from last year. He will continue to need to be careful with this.     Thyroid - he will need to check his thyroid level about yearly indefinitely.  He can have this done with his PCP.     Dental - He will need ongoing dental care as a consequence of his prior radiotherapy.     Dyspnea on exertion - I do not see any acute changes on his CT Chest - but he does have some emphysematous changes - I suggested that he discuss with his PCP - regarding getting PFTs and also possibly doing a cardiac stress test.  He will do this - he has an appt with PCP coming up.     I spent 20 minutes with the patient.  >50% of the time was spent in counseling and coordination of care.    Navarro Willingham   of Medicine  Division of Hematology, Oncology, and Transplantation

## 2020-09-18 NOTE — PROGRESS NOTES
Chief Complaint   Patient presents with     Blood Draw     IV placed, blood drawn, vitals taken.     Labs drawn via IV placed by Flora Clement MA   in left arm.

## 2020-10-27 ENCOUNTER — OFFICE VISIT (OUTPATIENT)
Dept: OTOLARYNGOLOGY | Facility: CLINIC | Age: 80
End: 2020-10-27
Payer: COMMERCIAL

## 2020-10-27 VITALS — OXYGEN SATURATION: 99 % | HEART RATE: 71 BPM | WEIGHT: 151 LBS | TEMPERATURE: 97.3 F | BODY MASS INDEX: 25.16 KG/M2

## 2020-10-27 DIAGNOSIS — R13.12 OROPHARYNGEAL DYSPHAGIA: Primary | ICD-10-CM

## 2020-10-27 PROCEDURE — 99214 OFFICE O/P EST MOD 30 MIN: CPT | Performed by: OTOLARYNGOLOGY

## 2020-10-27 RX ORDER — ATORVASTATIN CALCIUM 40 MG/1
TABLET, FILM COATED ORAL
COMMUNITY
Start: 2020-10-15

## 2020-10-27 ASSESSMENT — PAIN SCALES - GENERAL: PAINLEVEL: NO PAIN (0)

## 2020-10-27 NOTE — LETTER
10/27/2020       RE: Chema Morales  [address]  [facility] [address]     Dear Colleague,    Thank you for referring your patient, Chema Morales, to the [facility] [place] [facility]. Please see a copy of my visit note below.     HISTORY OF PRESENT ILLNESS:  Mr. Morales is here for followup today.  He has a history of a squamous cell carcinoma for which he had a neck dissection performed.  He is five years out now.  He is getting by reasonably well at the present time.  No other current complaints.  He is getting by reasonably well.          Last 2 Scores for Patient-Answered VHI Questionnaire  No flowsheet data found.    Last 2 Scores for Patient-Answered CSI Questionnaire  No flowsheet data found.      Last 2 Scores for Patient-Answered EAT Questionnaire  No flowsheet data found.        PAST MEDICAL HISTORY:   Past Medical History:   Diagnosis Date     GERD (gastroesophageal reflux disease)      High cholesterol      History of radiation therapy      Hoarseness July 2015    after radiation     Larynx cancer (H)     scca       PAST SURGICAL HISTORY:   Past Surgical History:   Procedure Laterality Date     COLONOSCOPY       DISSECTION RADICAL NECK MODIFIED Left 9/9/2015    Procedure: DISSECTION RADICAL NECK MODIFIED;  Surgeon: Delfina Rhodes MD;  Location: [facility] OR     EBUS  9/18/15     ENDOBRONCHIAL ULTRASOUND FLEXIBLE N/A 9/18/2015    Procedure: ENDOBRONCHIAL ULTRASOUND FLEXIBLE;  Surgeon: Mendez Hutchison MD;  Location:  GI     INSERT PORT VASCULAR ACCESS N/A 4/13/2015    Procedure: INSERT PORT VASCULAR ACCESS;  Surgeon: Daniel Brambila MD;  Location:  OR     REMOVE PORT VASCULAR ACCESS N/A 9/9/2016    Procedure: REMOVE PORT VASCULAR ACCESS;  Surgeon: Mirlande Rodriguez MD;  Location:  OR     REMV/REVISN FULL ARM/LEG CAST      Left leg       FAMILY HISTORY: No family history on file.    SOCIAL  HISTORY:   Social History     Tobacco Use     Smoking status: Former Smoker     Packs/day: 1.00     Years: 10.00     Pack years: 10.00     Types: Cigarettes, Clove cigarettes or kreteks, Pipe     Start date: 1962     Quit date: 1995     Years since quittin.8     Smokeless tobacco: Never Used   Substance Use Topics     Alcohol use: Yes       REVIEW OF SYSTEMS: Ten point review of systems was performed and is negative except for:   [facility] ENT ROS 10/26/2020   Constitutional -   Ears, Nose, Throat Hoarseness   Cardiopulmonary Cough   Gastrointestinal/Genitourinary Heartburn/indigestion, Constipation   Musculoskeletal -   Hematologic -   Endocrine Thirst, Frequent urination        ALLERGIES: Ampicillin, Contrast dye, Diagnostic x-ray materials, Neomycin, Neosporin [neomycin-polymyx-gramicid], No clinical screening - see comments, and Tape [adhesive tape]    MEDICATIONS:   Current Outpatient Medications   Medication Sig Dispense Refill     acetaminophen (TYLENOL) 500 MG tablet Take 1,000 mg by mouth       atorvastatin (LIPITOR) 40 MG tablet        cetirizine (ZYRTEC) 10 MG tablet Take 10 mg by mouth daily       docusate calcium (SURFAK) 240 MG capsule Take 240 mg by mouth       ibuprofen (ADVIL/MOTRIN) 200 MG tablet Take 200-400 mg by mouth       methylPREDNISolone (MEDROL) 32 MG tablet 1 tablet PO 12 hours before CT and 1 tablet PO 2 hours before CT 2 tablet 0     methylPREDNISolone (MEDROL) 32 MG tablet Take one tablet at 9PM tonight , take a second tablet at 7AM on  2 tablet 0     multivitamin, therapeutic with minerals (MULTI-VITAMIN) TABS Take 1 tablet by mouth daily       polyethylene glycol (MIRALAX/GLYCOLAX) Packet Take 17 g by mouth       pilocarpine (SALAGEN) 5 MG tablet Take 1 tablet (5 mg) by mouth 3 times daily 90 tablet 3     PILOCARPINE HCL PO          PHYSICAL EXAMINATION:  He  is awake, alert and in no apparent distress.    His tympanic membranes are clear and intact bilaterally.  External auditory canals are clear.  Nasal exam shows a mild septal deviation without obstruction.  Examination of the oral cavity shows no suspicious lesions.  There is symmetric movement of the tongue and soft palate.    The oropharynx is clear.  His neck is supple without significant adenopathy.  Pulse is regular.  Upper airway is clear.  Cranial nerves II-XII are grossly intact.          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      PLAN:  We will see how he is doing again over the course of the next year.       Jhoan Lee MD

## 2020-10-27 NOTE — NURSING NOTE
Chief Complaint   Patient presents with     RECHECK     follow up       Pulse 71, temperature 97.3  F (36.3  C), SpO2 99 %.    Catie Zambrano, EMT

## 2020-10-27 NOTE — PATIENT INSTRUCTIONS
1. You were seen in the ENT Clinic today by Dr. Lee.  If you have any questions or concerns after your appointment, please call   -  ENT Clinic: [phone]      2.   Plan to return to clinic in one year    Gem Gramajo LPN  [facility]- Otolaryngology  [phone]    Or    Estrella Silvestre RN  [facility]- Otolaryngology   [phone]

## 2020-10-27 NOTE — NURSING NOTE
Chief Complaint   Patient presents with     RECHECK     follow up       Pulse 71, temperature 97.3  F (36.3  C), weight 68.5 kg (151 lb), SpO2 99 %.    Catie Zambrano, EMT

## 2020-10-27 NOTE — PROGRESS NOTES
HISTORY OF PRESENT ILLNESS:  Mr. Morales is here for followup today.  He has a history of a squamous cell carcinoma for which he had a neck dissection performed.  He is five years out now.  He is getting by reasonably well at the present time.  No other current complaints.  He is getting by reasonably well.          Last 2 Scores for Patient-Answered VHI Questionnaire  No flowsheet data found.    Last 2 Scores for Patient-Answered CSI Questionnaire  No flowsheet data found.      Last 2 Scores for Patient-Answered EAT Questionnaire  No flowsheet data found.        PAST MEDICAL HISTORY:   Past Medical History:   Diagnosis Date     GERD (gastroesophageal reflux disease)      High cholesterol      History of radiation therapy      Hoarseness 2015    after radiation     Larynx cancer (H)     scca       PAST SURGICAL HISTORY:   Past Surgical History:   Procedure Laterality Date     COLONOSCOPY       DISSECTION RADICAL NECK MODIFIED Left 2015    Procedure: DISSECTION RADICAL NECK MODIFIED;  Surgeon: Delfina Rhodes MD;  Location: [facility] OR     EBUS  9/18/15     ENDOBRONCHIAL ULTRASOUND FLEXIBLE N/A 2015    Procedure: ENDOBRONCHIAL ULTRASOUND FLEXIBLE;  Surgeon: Mendez Hutchison MD;  Location: [facility] GI     INSERT PORT VASCULAR ACCESS N/A 2015    Procedure: INSERT PORT VASCULAR ACCESS;  Surgeon: Daniel Brambila MD;  Location: [facility] OR     REMOVE PORT VASCULAR ACCESS N/A 2016    Procedure: REMOVE PORT VASCULAR ACCESS;  Surgeon: Mirlande Rodriguez MD;  Location: [facility] OR     REMV/REVISN FULL ARM/LEG CAST      Left leg       FAMILY HISTORY: No family history on file.    SOCIAL HISTORY:   Social History     Tobacco Use     Smoking status: Former Smoker     Packs/day: 1.00     Years: 10.00     Pack years: 10.00     Types: Cigarettes, Clove cigarettes or kreteks, Pipe     Start date: 1962     Quit date: 1995     Years since quittin.8     Smokeless tobacco: Never Used   Substance Use  Topics     Alcohol use: Yes       REVIEW OF SYSTEMS: Ten point review of systems was performed and is negative except for:   [facility] ROS 10/26/2020   Constitutional -   Ears, Nose, Throat Hoarseness   Cardiopulmonary Cough   Gastrointestinal/Genitourinary Heartburn/indigestion, Constipation   Musculoskeletal -   Hematologic -   Endocrine Thirst, Frequent urination        ALLERGIES: Ampicillin, Contrast dye, Diagnostic x-ray materials, Neomycin, Neosporin [neomycin-polymyx-gramicid], No clinical screening - see comments, and Tape [adhesive tape]    MEDICATIONS:   Current Outpatient Medications   Medication Sig Dispense Refill     acetaminophen (TYLENOL) 500 MG tablet Take 1,000 mg by mouth       atorvastatin (LIPITOR) 40 MG tablet        cetirizine (ZYRTEC) 10 MG tablet Take 10 mg by mouth daily       docusate calcium (SURFAK) 240 MG capsule Take 240 mg by mouth       ibuprofen (ADVIL/MOTRIN) 200 MG tablet Take 200-400 mg by mouth       methylPREDNISolone (MEDROL) 32 MG tablet 1 tablet PO 12 hours before CT and 1 tablet PO 2 hours before CT 2 tablet 0     methylPREDNISolone (MEDROL) 32 MG tablet Take one tablet at 9PM tonight 9-26, take a second tablet at 7AM on 9-27 2 tablet 0     multivitamin, therapeutic with minerals (MULTI-VITAMIN) TABS Take 1 tablet by mouth daily       polyethylene glycol (MIRALAX/GLYCOLAX) Packet Take 17 g by mouth       pilocarpine (SALAGEN) 5 MG tablet Take 1 tablet (5 mg) by mouth 3 times daily 90 tablet 3     PILOCARPINE HCL PO          PHYSICAL EXAMINATION:  He  is awake, alert and in no apparent distress.    His tympanic membranes are clear and intact bilaterally. External auditory canals are clear.  Nasal exam shows a mild septal deviation without obstruction.  Examination of the oral cavity shows no suspicious lesions.  There is symmetric movement of the tongue and soft palate.    The oropharynx is clear.  His neck is supple without significant adenopathy.  Pulse is regular.  Upper  airway is clear.  Cranial nerves II-XII are grossly intact.            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      PLAN:  We will see how he is doing again over the course of the next year.             Jhoan Lee MD

## 2020-12-20 ENCOUNTER — HEALTH MAINTENANCE LETTER (OUTPATIENT)
Age: 80
End: 2020-12-20

## 2020-12-22 NOTE — PROGRESS NOTES
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 Patient did quite well but a residual node was noted at the 3-month PET/CT scan so he underwent a left neck dissection 9/9/2015. Pathology from the neck dissection on September 9th showed 1 out of 3 nodes being positive and that one node containing nonviable tumor.    HISTORY OF PRESENT ILLNESS:  Mr. Morales has returned for follow-up oncologic surveillance. He has just returned from [facility] with his wife a few weeks ago. He is now 2 years and 9 months out from his neck dissection. Last scans 9/27/17 were clear. Patient is doing very well today. He is very bright and cheery and comes with his son and wife. He continues to have occasional hoarseness and cough. These have been ongoing symptoms since his chemoradiation treatment and they have not changed since we last saw him. He reports that he uses Mucinex and lozenges occasionally for the cough which does help expel the mucous. He denies any bleeding, unexpected weight loss, dysphagia, hemoptysis, new lumps or bumps, and pain.     ALLERGIES:  Allergies   Allergen Reactions     Ampicillin Nausea     Contrast Dye Itching     Pt had itching on his tongue and cheek following injection of Isovue 370 (CT contrast)     Diagnostic X-Ray Materials Itching     Pt had itching on his tongue and cheek following injection of Isovue 370 (CT contrast)     No Clinical Screening - See Comments Itching     bandaids     Neomycin Rash     Neosporin [Neomycin-Polymyx-Gramicid] Itching     Tape [Adhesive Tape] Rash     Cotton and paper tape ok      MEDICATIONS:  Current Outpatient Prescriptions   Medication     docusate calcium (SURFAK) 240 MG capsule     methylPREDNISolone (MEDROL) 32 MG tablet      "multivitamin, therapeutic with minerals (MULTI-VITAMIN) TABS     pilocarpine (SALAGEN) 5 MG tablet     polyethylene glycol (MIRALAX/GLYCOLAX) Packet     FLUZONE HIGH-DOSE 0.5 ML injection     methylPREDNISolone (MEDROL) 32 MG tablet     No current facility-administered medications for this visit.      PAST MEDICAL HISTORY:  Past Medical History:   Diagnosis Date     GERD (gastroesophageal reflux disease)      High cholesterol      History of radiation therapy      Hoarseness July 2015    after radiation     Larynx cancer (H)     scca       PHYSICAL EXAMINATION:    /76  Ht 1.651 m (5' 5\")  Wt 66.2 kg (146 lb)  BMI 24.3 kg/m2  Constitutional:  The patient was accompanied by his wife and son, well-groomed, and in no acute distress.     Skin: Normal:  warm and pink without rash   Neurologic: Alert and oriented.   Psychiatric: The patient's affect was calm, cooperative, and appropriate.     Communication:  Hoarse. Patient clearing his throat often.    Respiratory: Breathing comfortably without stridor or exertion of accessory muscles.    Head/Face:  Normocephalic and atraumatic.  No lesions or scars.   Oral Cavity: Normal tongue, floor of mouth, buccal mucosa, and palate.  No lesions or masses on inspection or palpation.     Oropharynx: Normal mucosa, palate symmetric with normal elevation. No abnormal lymph tissue in the oropharynx. Palpation revealed no masses or induration.   Neck: Right side is supple with normal laryngeal and tracheal landmarks.  Left side is woody secondary to radiation changes especially along the anterior border of the SCM.    Lymphatic: There is no palpable lymphadenopathy in the neck.        PROCEDURE:   FIBEROPTIC ENDOSCOPY:  Consent for fiberoptic laryngoscopy was obtained, and we confirmed correctness of procedure and identity of patient. Fiberoptic laryngoscopy was indicated due to prior history of left piriform sinus cancer. The nose was topically decongested and anesthetized. The " fiberoptic laryngoscope was passed under endoscopic vision through the left nare. The turbinates were normal. The inferior and middle meati were clear bilaterally without purulence, masses, or polyps. The nasopharynx was clear. The Eustachian tubes were clear. The soft palate appeared normal with good mobility. The epiglottis was sharp and the visualized portion of the vallecula was clear. Laryngeal surface of the epiglottis had a red hematoma midline.The larynx was clear with mobile cords. The arytenoids were clear and there was no pooling in the hypopharynx. Both piriform sinuses were not very open today and it was difficult to visualize them. However, what I was able to see was clear.    ASSESSMENT AND PLAN:  Chema Morales is a 78 year old male with a history of SCC of the left piriform sinus 2 years and 9 months s/p initial chemoradiation therapy and then left neck dissection. Patient is doing very well today and has ENRIQUE. We will follow-up with him in 3 months and obtain his 3 year scans at that time as well.     Patient was also evaluated by Dr. Wynne today.      Kay Ortiz PA-C  Otolaryngology - Head & Neck Surgery  [phone]      CC:  Boone Wynne MD  Otolaryngology/Head & Neck Surgery  [facility] [address]    Delfina Rhodes MD    Otolaryngology Clinic   [facility] [address]       Pawan Morrissey MD   [address], [place] [address]       Joanie Yeboah MD   [facility] [address]    room air

## 2021-04-24 ENCOUNTER — HEALTH MAINTENANCE LETTER (OUTPATIENT)
Age: 81
End: 2021-04-24

## 2021-09-21 ENCOUNTER — OFFICE VISIT (OUTPATIENT)
Dept: OTOLARYNGOLOGY | Facility: CLINIC | Age: 81
End: 2021-09-21
Payer: COMMERCIAL

## 2021-09-21 ENCOUNTER — THERAPY VISIT (OUTPATIENT)
Dept: SPEECH THERAPY | Facility: CLINIC | Age: 81
End: 2021-09-21
Payer: COMMERCIAL

## 2021-09-21 VITALS — BODY MASS INDEX: 24.66 KG/M2 | WEIGHT: 148 LBS | HEIGHT: 65 IN

## 2021-09-21 DIAGNOSIS — R13.12 OROPHARYNGEAL DYSPHAGIA: Primary | ICD-10-CM

## 2021-09-21 DIAGNOSIS — C12 SQUAMOUS CELL CARCINOMA OF PYRIFORM SINUS (H): ICD-10-CM

## 2021-09-21 PROCEDURE — 99213 OFFICE O/P EST LOW 20 MIN: CPT | Performed by: OTOLARYNGOLOGY

## 2021-09-21 PROCEDURE — 92610 EVALUATE SWALLOWING FUNCTION: CPT | Mod: GN | Performed by: SPEECH-LANGUAGE PATHOLOGIST

## 2021-09-21 ASSESSMENT — PAIN SCALES - GENERAL: PAINLEVEL: NO PAIN (0)

## 2021-09-21 ASSESSMENT — MIFFLIN-ST. JEOR: SCORE: 1308.2

## 2021-09-21 NOTE — LETTER
"2021       RE: Chema Morales  [address]  [facility] [address]     Dear Colleague,    Thank you for referring your patient, Chema Morales, to the [facility] [place] at [facility]. Please see a copy of my visit note below.      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 He was last seen on 10/27/2020 and denied any complaints at that time.    Today, the patient states he is working on getting dentures as he has had all of his teeth pulled. He reports food sometimes gets stuck in his throat when he swallows.      Review of Systems:   Pertinent items are noted in HPI or as in patient entered ROS below, remainder of complete ROS is negative.    ENT ROS 2021   Constitutional -   Ears, Nose, Throat Nasal congestion or drainage, Trouble swallowing   Cardiopulmonary Cough   Gastrointestinal/Genitourinary Heartburn/indigestion, Constipation   Musculoskeletal -   Hematologic -   Endocrine Frequent urination        Physical Exam:   Ht 1.651 m (5' 5\")   Wt 67.1 kg (148 lb)   BMI 24.63 kg/m       PHYSICAL EXAMINATION:    Constitutional:  The patient was accompanied by a family member, well-groomed, and in no acute distress.    Skin:  Warm and pink.    Neurologic:  Alert and oriented x 3.  CN's III-XII within normal limits.  Voice normal.   Psychiatric:  The patient's affect was calm, cooperative, and appropriate.    Respiratory:  Breathing comfortably without stridor or exertion of accessory muscles.    Eyes: Extraocular movement intact.    Head:  Normocephalic and atraumatic.  No lesions " or scars.    OC/OP:  Normal tongue, floor of mouth, buccal mucosa, and palate.  No lesions or masses on inspection or palpation.  No abnormal lymph tissue in the oropharynx.  The pterygoid region is non-tender.    Neck:  Supple with normal laryngeal and tracheal landmarks.  The parotid beds were without masses.  No palpable thyroid.  Lymphatic:  There is no palpable lymphadenopathy in the neck.      Assessment and Plan:  No diagnosis found.  I will refer him to a speech specialist and order a swallow study. We discussed late stage effects of radiation and how this may be contributing to his trouble swallowing.    Follow-up: No follow-ups on file.         Scribe Disclosure:  I, Ksenia Vera, am serving as a scribe to document services personally performed by Jhoan Lee MD at this visit, based upon the provider's statements to me. All documentation has been reviewed by the aforementioned provider prior to being entered into the official medical record.       Again, thank you for allowing me to participate in the care of your patient.      Sincerely,    Jhoan Lee MD

## 2021-09-21 NOTE — PROGRESS NOTES
"     Speech-Language Pathology Department   EVALUATION  [facility]  Clinical Swallow Evaluation    09/21/21 0915   General Information   Type Of Visit Initial   Start Of Care Date 09/21/21   Referring Physician Dr. 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  He did quite well, but a residual node was noted at the three-month PET CT scan, and he underwent a neck dissection on 09/09/2015. A few years ago he had recurrent pneumonias with aspiration as possible etiology. He worked with a speech pathologist at an outside hospital. He returns today and is seen in conjunction with ENT clinic visit per MD request. Reports new episode of coughing pt calls \"choking\" with rice [place] dish. Pt states he thinks he took too big of a bite and d/t being edentulous could not chew it. Denies coughing/TC with other items and liquids. He has not had any new, recent pneumonias.    Respiratory Status Room air   Prior Level Of Function Swallowing   Prior Level Of Function Comment Soft solids/thin liquids as pt is edentulous   General Observations Pt pleasant and cooperative throughout evaluation.   Patient/family Goals Evaluate swallow function given coughing episode last month reported by pt   General Information Comments Pt accompanied by his son   Clinical Swallow Evaluation   Oral Musculature generally intact   Structural Abnormalities present   Dentition edentulous, does not have dentures   Mucosal Quality " adequate   Mandibular Strength and Mobility intact   Oral Labial Strength and Mobility WFL   Lingual Strength and Mobility WFL   Velar Elevation impaired  (slightly less movement on the right)   Buccal Strength and Mobility intact   Laryngeal Function Cough;Swallow;Voicing initiated   Oral Musculature Comments Pt is edentulous and does not have dentures. Slightly less velar movement on the right. Otherwise WFL   Additional Documentation Yes   Clinical Swallow Eval: Thin Liquid Texture Trial   Mode of Presentation, Thin Liquids cup;self-fed   Volume of Liquid or Food Presented 3oz   Oral Phase of Swallow WFL   Pharyngeal Phase of Swallow intact   Diagnostic Statement No overt clinical s/sx of penetration/aspiration.    Swallow Compensations   Swallow Compensations No compensations were used   Educational Assessment   Barriers to Learning No barriers   Swallow Eval: Clinical Impressions   Skilled Criteria for Therapy Intervention Other (see comments)  (Further evaluation warranted)   Dysphagia Outcome Severity Scale (ALEXIS) Level 5 - ALEXIS   Treatment Diagnosis Mild oropharyngeal dysphagia   Diet texture recommendations Soft & Bite Sized diet (level 6);Thin liquids (level 0)   Recommended Feeding/Eating Techniques alternate between small bites and sips of food/liquid;maintain upright posture during/after eating for 30 mins;small sips/bites   Predicted Duration of Therapy Intervention (days/wks) Further evaluation warranted; plan for video swallow study to further evaluate   Anticipated Discharge Disposition home w/ outpatient services   Risks and Benefits of Treatment have been explained. Yes   Patient, family and/or staff in agreement with Plan of Care Yes   Clinical Impression Comments Pt presents today with mild oropharyngeal dysphagia characterized by lack of dentition. Oral motor examination reveals Pt is edentulous and does not have dentures. Slightly less velar movement on the right. Otherwise WFL. Pt assessed  with thin liquids with no overt clinical s/sx of penetration/aspiration. Pt reports he thinks the episode where he felt the rice get stuck and coughed was because he could not chew. Pt and family are interested in further evaluation given hx of XRT and risk of XRT induced fibrosis. Will plan for video swallow study after pt gets his dentures later this fall. Trained pt to continue with soft and bite sized items with thin liquids (IDDSI Level 6:0) with use of safe swallow strategies: small sips, small bites, alternate solids/liquids, slow pace. Pt recalled swallow exercises and states he continue to do these daily. Encouraged continued completion.    Total Session Time   SLP Eval: oral/pharyngeal swallow function, clinical minutes (32273) 15   Total Evaluation Time 15     JULY Frank MA (music), CCC-SLP   Speech Language Pathologist  [facility] Trained Vocologist   [facility] [place]  Dept. of Otolaryngology  Department of Rehabilitation Services  [address] [place] [id number]  Email: [email or web address]   Phone: [phone]  Pronouns: she/her/hers

## 2021-09-21 NOTE — PROGRESS NOTES
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 He was last seen on 10/27/2020 and denied any complaints at that time.    Today, the patient states he is working on getting dentures as he has had all of his teeth pulled. He reports food sometimes gets stuck in his throat when he swallows.      Review of Systems:   Pertinent items are noted in HPI or as in patient entered ROS below, remainder of complete ROS is negative.    ENT ROS 2021   Constitutional -   Ears, Nose, Throat Nasal congestion or drainage, Trouble swallowing   Cardiopulmonary Cough   Gastrointestinal/Genitourinary Heartburn/indigestion, Constipation   Musculoskeletal -   Hematologic -   Endocrine Frequent urination        Physical Exam:   Ht 1.651 m (5' 5\")   Wt 67.1 kg (148 lb)   BMI 24.63 kg/m       PHYSICAL EXAMINATION:    Constitutional:  The patient was accompanied by a family member, well-groomed, and in no acute distress.    Skin:  Warm and pink.    Neurologic:  Alert and oriented x 3.  CN's III-XII within normal limits.  Voice normal.   Psychiatric:  The patient's affect was calm, cooperative, and appropriate.    Respiratory:  Breathing comfortably without stridor or exertion of accessory muscles.    Eyes: Extraocular movement intact.    Head:  Normocephalic and atraumatic.  No lesions or scars.    OC/OP:  Normal tongue, floor of mouth, buccal mucosa, and palate.  No lesions or masses on inspection or palpation.  No abnormal lymph tissue in the oropharynx.  The pterygoid region is non-tender.    Neck:  Supple with normal laryngeal and tracheal landmarks.  The parotid beds were without masses.  No palpable " thyroid.  Lymphatic:  There is no palpable lymphadenopathy in the neck.      Assessment and Plan:  No diagnosis found.  I will refer him to a speech specialist and order a swallow study. We discussed late stage effects of radiation and how this may be contributing to his trouble swallowing.    Follow-up: No follow-ups on file.         Scribe Disclosure:  I, Ksenia Vera, am serving as a scribe to document services personally performed by Jhoan Lee MD at this visit, based upon the provider's statements to me. All documentation has been reviewed by the aforementioned provider prior to being entered into the official medical record.

## 2021-09-21 NOTE — NURSING NOTE
"Chief Complaint   Patient presents with     RECHECK     yearly follow up       Height 1.651 m (5' 5\"), weight 67.1 kg (148 lb).    Catie Zambrano, EMT    "

## 2021-10-03 ENCOUNTER — HEALTH MAINTENANCE LETTER (OUTPATIENT)
Age: 81
End: 2021-10-03

## 2021-12-20 ENCOUNTER — TELEPHONE (OUTPATIENT)
Dept: OTOLARYNGOLOGY | Facility: CLINIC | Age: 81
End: 2021-12-20
Payer: COMMERCIAL

## 2022-05-15 ENCOUNTER — HEALTH MAINTENANCE LETTER (OUTPATIENT)
Age: 82
End: 2022-05-15

## 2022-08-04 NOTE — PROGRESS NOTES
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 At our last visit 21 he was having some trouble swallowing food. He has since followed up with swallow therapy. Today he tells me that he is doing well and has been eating and drinking well. He has gotten dentures and has not had any complications with these. He is planning to visit family in [facility] soon.      Review of Systems:   Pertinent items are noted in HPI or as in patient entered ROS below, remainder of complete ROS is negative.    ENT ROS 2021   Constitutional: -   Ears, Nose, Throat: Nasal congestion or drainage, Trouble swallowing   Cardiopulmonary: Cough   Gastrointestinal/Genitourinary: Heartburn/indigestion, Constipation   Musculoskeletal: -   Hematologic: -   Endocrine: Frequent urination        Physical Exam:   /69 (BP Location: Right arm, Patient Position: Sitting, Cuff Size: Adult Regular)   Pulse 63   Temp 97.5  F (36.4  C) (Temporal)   Ht 1.651 m (5' 5\")   Wt 68.5 kg (151 lb)   SpO2 96%   BMI 25.13 kg/m       PHYSICAL EXAMINATION:    Constitutional:  The patient was accompanied, well-groomed, and in no acute distress.    Skin:  Warm and pink.    Neurologic:  Alert and oriented x 3.  CN's III-XII within normal limits.  Voice normal.   Psychiatric:  The patient's affect was calm, cooperative, and appropriate.    Respiratory:  Breathing comfortably without stridor or exertion of accessory muscles.    Eyes: Extraocular movement intact.    Head:  Normocephalic and atraumatic.  No lesions or scars.    Ears:  Pinnae and tragus non-tender.  EAC's and TM's were clear.    Nose:  Sinuses were non-tender.  Anterior " rhinoscopy revealed midline septum and absence of purulence or polyps.    OC/OP:  Normal tongue, floor of mouth, buccal mucosa, and palate.  No lesions or masses on inspection or palpation.  No abnormal lymph tissue in the oropharynx.  The pterygoid region is non-tender.    Neck:  Supple with normal laryngeal and tracheal landmarks.  The parotid beds were without masses.  No palpable thyroid.  Lymphatic:  There is no palpable lymphadenopathy in the neck.      Assessment and Plan:  No diagnosis found.  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 His exam today is reassuring with no signs of cancer recurrence and he will follow-up in 1 year.        Scribe Disclosure:  I, Meghna Pierce, am serving as a scribe to document services personally performed by Jhoan Lee MD at this visit, based upon the provider's statements to me. All documentation has been reviewed by the aforementioned provider prior to being entered into the official medical record.

## 2022-08-09 ENCOUNTER — OFFICE VISIT (OUTPATIENT)
Dept: OTOLARYNGOLOGY | Facility: CLINIC | Age: 82
End: 2022-08-09
Payer: COMMERCIAL

## 2022-08-09 VITALS
TEMPERATURE: 97.5 F | DIASTOLIC BLOOD PRESSURE: 69 MMHG | HEART RATE: 63 BPM | BODY MASS INDEX: 25.16 KG/M2 | HEIGHT: 65 IN | SYSTOLIC BLOOD PRESSURE: 115 MMHG | OXYGEN SATURATION: 96 % | WEIGHT: 151 LBS

## 2022-08-09 DIAGNOSIS — R13.12 OROPHARYNGEAL DYSPHAGIA: Primary | ICD-10-CM

## 2022-08-09 PROCEDURE — 31575 DIAGNOSTIC LARYNGOSCOPY: CPT | Performed by: OTOLARYNGOLOGY

## 2022-08-09 PROCEDURE — 99213 OFFICE O/P EST LOW 20 MIN: CPT | Mod: 25 | Performed by: OTOLARYNGOLOGY

## 2022-08-09 ASSESSMENT — PAIN SCALES - GENERAL: PAINLEVEL: NO PAIN (0)

## 2022-08-09 NOTE — NURSING NOTE
"Chief Complaint   Patient presents with     RECHECK     Yearly follow up       Blood pressure 115/69, pulse 63, temperature 97.5  F (36.4  C), temperature source Temporal, height 1.651 m (5' 5\"), weight 68.5 kg (151 lb), SpO2 96 %.    Catie Zambrano, EMT    "

## 2022-08-09 NOTE — LETTER
"2022       RE: Chema Morales  [address]  [facility] [address]     Dear Colleague,    Thank you for referring your patient, Chema Morales, to the [facility] [place] at [facility]. Please see a copy of my visit note below.      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 At our last visit 21 he was having some trouble swallowing food. He has since followed up with swallow therapy. Today he tells me that he is doing well and has been eating and drinking well. He has gotten dentures and has not had any complications with these. He is planning to visit family in [place] soon.      Review of Systems:   Pertinent items are noted in HPI or as in patient entered ROS below, remainder of complete ROS is negative.    ENT ROS 2021   Constitutional: -   Ears, Nose, Throat: Nasal congestion or drainage, Trouble swallowing   Cardiopulmonary: Cough   Gastrointestinal/Genitourinary: Heartburn/indigestion, Constipation   Musculoskeletal: -   Hematologic: -   Endocrine: Frequent urination        Physical Exam:   /69 (BP Location: Right arm, Patient Position: Sitting, Cuff Size: Adult Regular)   Pulse 63   Temp 97.5  F (36.4  C) (Temporal)   Ht 1.651 m (5' 5\")   Wt 68.5 kg (151 lb)   SpO2 96%   BMI 25.13 kg/m       PHYSICAL EXAMINATION:    Constitutional:  The patient was accompanied, well-groomed, and in no acute distress.    Skin:  Warm and pink.    Neurologic:  Alert and oriented x 3.  CN's III-XII within normal limits.  Voice normal.   Psychiatric:  The patient's affect was calm, " cooperative, and appropriate.    Respiratory:  Breathing comfortably without stridor or exertion of accessory muscles.    Eyes: Extraocular movement intact.    Head:  Normocephalic and atraumatic.  No lesions or scars.    Ears:  Pinnae and tragus non-tender.  EAC's and TM's were clear.    Nose:  Sinuses were non-tender.  Anterior rhinoscopy revealed midline septum and absence of purulence or polyps.    OC/OP:  Normal tongue, floor of mouth, buccal mucosa, and palate.  No lesions or masses on inspection or palpation.  No abnormal lymph tissue in the oropharynx.  The pterygoid region is non-tender.    Neck:  Supple with normal laryngeal and tracheal landmarks.  The parotid beds were without masses.  No palpable thyroid.  Lymphatic:  There is no palpable lymphadenopathy in the neck.      Assessment and Plan:  No diagnosis found.  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 His exam today is reassuring with no signs of cancer recurrence and he will follow-up in 1 year.        Scribe Disclosure:  I, Meghna Pierce, am serving as a scribe to document services personally performed by Jhoan Lee MD at this visit, based upon the provider's statements to me. All documentation has been reviewed by the aforementioned provider prior to being entered into the official medical record.         Again, thank you for allowing me to participate in the care of your patient.      Sincerely,    Jhoan Lee MD

## 2022-08-09 NOTE — LETTER
Date:August 10, 2022      Patient was self referred, no letter generated. Do not send.        [facility] Health Information

## 2022-08-09 NOTE — PATIENT INSTRUCTIONS
"You were seen in the clinic today by Dr. Lee. If you have any questions or concerns after your appointment, please call the clinic at [phone]. Press \"1\" for scheduling, press \"3\" for nurse advice.    2.   Plan to return the clinic in 1 year.       Bernarda Crow LPN  [facility]  Department of Otolaryngology  [phone]   "

## 2022-09-11 ENCOUNTER — HEALTH MAINTENANCE LETTER (OUTPATIENT)
Age: 82
End: 2022-09-11

## 2023-01-22 ENCOUNTER — HEALTH MAINTENANCE LETTER (OUTPATIENT)
Age: 83
End: 2023-01-22

## 2023-06-09 ENCOUNTER — TELEPHONE (OUTPATIENT)
Dept: OTOLARYNGOLOGY | Facility: CLINIC | Age: 83
End: 2023-06-09
Payer: COMMERCIAL

## 2023-08-08 ENCOUNTER — OFFICE VISIT (OUTPATIENT)
Dept: OTOLARYNGOLOGY | Facility: CLINIC | Age: 83
End: 2023-08-08
Payer: COMMERCIAL

## 2023-08-08 VITALS
BODY MASS INDEX: 25.49 KG/M2 | SYSTOLIC BLOOD PRESSURE: 134 MMHG | OXYGEN SATURATION: 97 % | HEART RATE: 75 BPM | WEIGHT: 153 LBS | DIASTOLIC BLOOD PRESSURE: 74 MMHG | HEIGHT: 65 IN

## 2023-08-08 DIAGNOSIS — R13.12 OROPHARYNGEAL DYSPHAGIA: Primary | ICD-10-CM

## 2023-08-08 PROCEDURE — 99214 OFFICE O/P EST MOD 30 MIN: CPT | Performed by: OTOLARYNGOLOGY

## 2023-08-08 ASSESSMENT — PAIN SCALES - GENERAL: PAINLEVEL: NO PAIN (0)

## 2023-08-08 NOTE — PROGRESS NOTES
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 At our last visit 22 he was having some trouble swallowing food. He has since followed up with swallow therapy. Today he tells me that he is doing well and has been eating and drinking well. He has gotten dentures and has not had any complications with these.  [facility] clean recent imaging from there as well.      Review of Systems:   Pertinent items are noted in HPI or as in patient entered ROS below, remainder of complete ROS is negative.       2021     2:50 PM    ENT ROS   Ears, Nose, Throat Nasal congestion or drainage    Trouble swallowing   Cardiopulmonary Cough   Gastrointestinal/Genitourinary Heartburn/indigestion    Constipation   Endocrine Frequent urination        Physical Exam:   /74 (BP Location: Left arm, Patient Position: Sitting, Cuff Size: Adult Large)   Pulse 75   Ht 1.651 m (5' 5\")   Wt 69.4 kg (153 lb)   SpO2 97%   BMI 25.46 kg/m       PHYSICAL EXAMINATION:    Constitutional:  The patient was accompanied, well-groomed, and in no acute distress.    Skin:  Warm and pink.    Neurologic:  Alert and oriented x 3.  CN's III-XII within normal limits.  Voice normal.   Psychiatric:  The patient's affect was calm, cooperative, and appropriate.    Respiratory:  Breathing comfortably without stridor or exertion of accessory muscles.    Eyes: Extraocular movement intact.    Head:  Normocephalic and atraumatic.  No lesions or scars.    Ears:  Pinnae and tragus non-tender.  EAC's and TM's were clear.    Nose:  Sinuses were non-tender.  Anterior rhinoscopy revealed midline septum and " absence of purulence or polyps.    OC/OP:  Normal tongue, floor of mouth, buccal mucosa, and palate.  No lesions or masses on inspection or palpation.  No abnormal lymph tissue in the oropharynx.  The pterygoid region is non-tender.    Neck:  Supple with normal laryngeal and tracheal landmarks.  The parotid beds were without masses.  No palpable thyroid.  Lymphatic:  There is no palpable lymphadenopathy in the neck.      Assessment and Plan:    ICD-10-CM    1. 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 His exam today is reassuring with no signs of cancer recurrence and he will follow-up in 1 year. Will check swallow study        S

## 2023-08-08 NOTE — PATIENT INSTRUCTIONS
"You were seen in the clinic today by Dr. Lee. If you have any questions or concerns after your appointment, please call the clinic at [phone]. Press \"1\" for scheduling, press \"3\" for nurse advice.    We have ordered a video swallow study referral to assess your dysphagia.  Please schedule this at your earliest convenience.    3..   The following has been recommended for you based upon your appointment today:   -Plan to return to the clinic in 1 year.    Shala HENDERSON, RN  [facility]  Department of Otolaryngology  [phone]      "

## 2023-08-08 NOTE — LETTER
"2023       RE: Chema Morales  [address]  [facility] [address]     Dear Colleague,    Thank you for referring your patient, Chema Morales, to the [facility]AR NOSE [facility] THROAT CLINIC [place] at [facility]. Please see a copy of my visit note below.      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 At our last visit 22 he was having some trouble swallowing food. He has since followed up with swallow therapy. Today he tells me that he is doing well and has been eating and drinking well. He has gotten dentures and has not had any complications with these.  [place] and clean recent imaging from there as well.      Review of Systems:   Pertinent items are noted in HPI or as in patient entered ROS below, remainder of complete ROS is negative.       2021     2:50 PM   [facility] ROS   Ears, Nose, Throat Nasal congestion or drainage    Trouble swallowing   Cardiopulmonary Cough   Gastrointestinal/Genitourinary Heartburn/indigestion    Constipation   Endocrine Frequent urination        Physical Exam:   /74 (BP Location: Left arm, Patient Position: Sitting, Cuff Size: Adult Large)   Pulse 75   Ht 1.651 m (5' 5\")   Wt 69.4 kg (153 lb)   SpO2 97%   BMI 25.46 kg/m       PHYSICAL EXAMINATION:    Constitutional:  The patient was accompanied, well-groomed, and in no acute distress.    Skin:  Warm and pink.    Neurologic:  Alert and oriented x 3.  CN's III-XII within normal limits.  Voice normal.   Psychiatric:  The patient's affect was calm, cooperative, and appropriate.  "   Respiratory:  Breathing comfortably without stridor or exertion of accessory muscles.    Eyes: Extraocular movement intact.    Head:  Normocephalic and atraumatic.  No lesions or scars.    Ears:  Pinnae and tragus non-tender.  EAC's and TM's were clear.    Nose:  Sinuses were non-tender.  Anterior rhinoscopy revealed midline septum and absence of purulence or polyps.    OC/OP:  Normal tongue, floor of mouth, buccal mucosa, and palate.  No lesions or masses on inspection or palpation.  No abnormal lymph tissue in the oropharynx.  The pterygoid region is non-tender.    Neck:  Supple with normal laryngeal and tracheal landmarks.  The parotid beds were without masses.  No palpable thyroid.  Lymphatic:  There is no palpable lymphadenopathy in the neck.      Assessment and Plan:    ICD-10-CM    1. 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 His exam today is reassuring with no signs of cancer recurrence and he will follow-up in 1 year. Will check swallow study          Again, thank you for allowing me to participate in the care of your patient.      Sincerely,    Jhoan Lee MD

## 2023-08-17 ENCOUNTER — ANCILLARY PROCEDURE (OUTPATIENT)
Dept: GENERAL RADIOLOGY | Facility: CLINIC | Age: 83
End: 2023-08-17
Attending: OTOLARYNGOLOGY
Payer: COMMERCIAL

## 2023-08-17 ENCOUNTER — THERAPY VISIT (OUTPATIENT)
Dept: SPEECH THERAPY | Facility: CLINIC | Age: 83
End: 2023-08-17
Attending: OTOLARYNGOLOGY
Payer: COMMERCIAL

## 2023-08-17 DIAGNOSIS — R13.12 OROPHARYNGEAL DYSPHAGIA: ICD-10-CM

## 2023-08-17 PROCEDURE — 92611 MOTION FLUOROSCOPY/SWALLOW: CPT | Mod: GN | Performed by: SPEECH-LANGUAGE PATHOLOGIST

## 2023-08-17 PROCEDURE — 74230 X-RAY XM SWLNG FUNCJ C+: CPT | Mod: GC | Performed by: RADIOLOGY

## 2023-08-17 RX ORDER — BARIUM SULFATE 400 MG/ML
SUSPENSION ORAL ONCE
Status: COMPLETED | OUTPATIENT
Start: 2023-08-17 | End: 2023-08-17

## 2023-08-17 RX ADMIN — BARIUM SULFATE 15 ML: 400 SUSPENSION ORAL at 13:29

## 2023-08-17 NOTE — PROGRESS NOTES
SPEECH LANGUAGE PATHOLOGY EVALUATION  Video Swallow Study    See electronic medical record for Abuse and Falls Screening details.    Subjective      Presenting condition or subjective complaint:   Pt is an 83 year old male that was seen today for a video swallow study. 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 Pt has also developed recurrent pneumonias suspected to be related to aspiration. Pt reports his last pneumonia was 3 years ago. Today he reports occasional sensation that food is sticking. Pt reports more difficulty with solids rather than liquids.  Pt denies any weight loss. He does endorse ongoing GERD.   Date of onset:      Relevant medical history:    6/1/2015  Dates & types of surgery:   9/9/2015- neck dissection.     Prior diagnostic imaging/testing results:      Last VFSS completed on 12/17/21  Prior therapy history for the same diagnosis, illness or injury:     Pt has particiated in dysphagia therapy at an outside facility.     Living Environment  Social support:   Pt accompanied by his son    Pain assessment: Pain denied     Objective     SWALLOW EVALUTION  Dysphagia history: History of dysphagia following radiation treatment  Current Diet/Method of Nutritional Intake: oral diet, thin liquids (level 0), regular diet        VIDEOFLUOROSCOPIC SWALLOW STUDY  Radiologist: resident  Views Taken: left lateral, A/P   Physical location of procedure:  [facility]  Patient sitting upright on chair/stool     VFSS textures trialed:   VFSS Eval: Thin Liquids  Mode of Presentation: cup, self-fed   Order of Presentation: 1, 2, 3, 9, 11, 14(AP)  Preparatory Phase: prolonged bolus preparation  Oral Phase: impaired AP movement  Bolus Location When Swallow Initiated: posterior laryngeal surface of epiglottis  Pharyngeal Phase: impaired pharyngoesophageal segment opening, impaired tongue base  retraction, pharyngeal wall coating, residue in valleculae, residue in pyriform sinus  Rosenbeck's Penetration Aspiration Scale: 2 - contrast enters airway, remains above the vocal cords, no residue remains (penetration)  Diagnostic Statement: Patient with thin liquids no aspiration.  Mild residue in the piriforms in the vallecula.  Noted trace residue in the PE segment.  Noted prominent CP bar with slight regurgitation of liquid from the PE segment to the piriform sinus.    VFSS Eval: Mildly Thick Liquids  Mode of Presentation: cup, self-fed   Order of Presentation: 4, 5  Preparatory Phase: WFL  Oral Phase: impaired AP movement  Bolus Location When Swallow Initiated: posterior laryngeal surface of epiglottis  Pharyngeal Phase: impaired pharyngoesophageal segment opening, impaired tongue base retraction, pharyngeal wall coating, residue in valleculae, residue in pyriform sinus  Rosenbeck's Penetration Aspiration Scale: 1 - no aspiration, contrast does not enter airway  Diagnostic Statement: Mild residue in the piriform sinus and vallecula and PE segment.  Noted prominent CP bar with slight regurgitation to the piriform sinus. No penetration, no aspiration.     VFSS Eval: Purees  Mode of Presentation: spoon, self-fed   Order of Presentation: 6, 7, 13(AP)  Preparatory Phase: prolonged bolus preparation  Oral Phase: impaired AP movement  Bolus Location When Swallow Initiated: valleculae  Pharyngeal Phase: impaired pharyngoesophageal segment opening, impaired tongue base retraction, pharyngeal wall coating, residue in valleculae, residue in pyriform sinus  Rosenbeck s Penetration Aspiration Scale: 1 - no aspiration, contrast does not enter airway  Diagnostic Statement: no Penetration or aspiration with trials of purée.  Mild residue at the base of the tongue, in the vallecula and at the posterior pharyngeal wall above the CP.    VFSS Eval: Solids  Mode of Presentation: self-fed   Order of Presentation: 8,  10  Preparatory Phase: prolonged bolus preparation  Oral Phase: impaired AP movement  Bolus Location When Swallow Initiated: posterior laryngeal surface of epiglottis  Pharyngeal Phase: impaired pharyngoesophageal segment opening, impaired tongue base retraction, pharyngeal wall coating, residue in valleculae, residue in pyriform sinus   Rosenbeck s Penetration Aspiration Scale: 1 - no aspiration, contrast does not enter airway  Diagnostic Statement: No penetration or aspiration with solids.  Noted residue at the base of the tongue, in the vallecula and in the PE segment.    VFSS Eval: Barium Tablet  Mode of Presentation: cup, self-fed   Order of Presentation: 12  Preparatory Phase: WFL  Oral Phase: WFL  Bolus Location When Swallow Initiated: valleculae  Pharyngeal Phase: WFL  Rosenbeck s Penetration Aspiration Scale: 1 - no aspiration, contrast does not enter airway  Diagnostic Statement: No overt signs of aspiration with trials of thin when taking barium tablet.  Barium tablet passed without difficulty.  Patient using a head tilt back posture to force pill to the back of tongue.    ESOPHAGEAL PHASE OF SWALLOW  patient reports symptoms of esophageal dysphagia  please refer to radiologist's report for details     SWALLOW ASSESSMENT CLINICAL IMPRESSIONS AND RATIONALE  Diet Consistency Recommendations: oral diet, thin liquids (level 0), regular diet    Recommended Feeding/Eating Techniques: small bolus size, alternate food and liquid intake   Medication Administration Recommendations: pills with water  Instrumental Assessment Recommendations:  none      Assessment & Plan   CLINICAL IMPRESSIONS   Medical Diagnosis:     SCC of the hypopharynx  Treatment Diagnosis:    Dysphagia  Impression/Assessment: Pt is a 83 year old male with swallowing complaints. The following significant findings have been identified: impaired swallowing, characterized by decreased strength at the base of tongue resulting in mild residue in the  vallecula, piriforms and posterior pharyngeal wall.  No aspiration was observed today on any trials.  Penetration with thin liquids only during larger cup sips.  Noted slight regurgitation of bolus from the PE segment up to the piriform sinus possibly related to a tight cricopharyngeus muscle.  Patient may benefit from GI assessment with recommendations to follow.  Recommend patient continue with a regular diet with thin liquids.  Recommend use of safe swallowing strategies, such as small sips and alternating solids and liquids.  Patient and son were educated on the anatomy and the results of today's swallow assessment.  Recommend patient continue with pharyngeal strengthening exercises daily to maintain his current swallow function.  Patient and son verbalized understanding of all information and agree with the plan of care.    PLAN OF CARE  Treatment Interventions:  Evaluation only    Prognosis to achieve stated therapy goals is good   Rehab potential is impacted by: current level of function, patient motivation  Recommended Referrals to Other Professionals:  GI  Education Assessment:      Risks and benefits of evaluation/treatment have been explained.   Patient/Family/caregiver agrees with Plan of Care.     Evaluation Time:          Present: Not applicable     Signing Clinician: Niurka Siegel    Thank you for the referral of Chema Morales. If you have any questions about this report, please contact me using the information below.    Niurka Siegel MS, CCC-SLP  Speech-Language Pathology  [facility] [place]  Department of Otolaryngology/D&T - 4th floor  Phone: [phone]  Email: [email or web address]

## 2023-11-20 ENCOUNTER — TELEPHONE (OUTPATIENT)
Dept: OTOLARYNGOLOGY | Facility: CLINIC | Age: 83
End: 2023-11-20
Payer: COMMERCIAL

## 2023-12-05 ENCOUNTER — OFFICE VISIT (OUTPATIENT)
Dept: OTOLARYNGOLOGY | Facility: CLINIC | Age: 83
End: 2023-12-05
Payer: COMMERCIAL

## 2023-12-05 VITALS
DIASTOLIC BLOOD PRESSURE: 78 MMHG | SYSTOLIC BLOOD PRESSURE: 122 MMHG | WEIGHT: 153.5 LBS | HEART RATE: 72 BPM | HEIGHT: 65 IN | OXYGEN SATURATION: 100 % | BODY MASS INDEX: 25.58 KG/M2 | TEMPERATURE: 98.1 F

## 2023-12-05 DIAGNOSIS — R13.12 OROPHARYNGEAL DYSPHAGIA: Primary | ICD-10-CM

## 2023-12-05 DIAGNOSIS — C12 SQUAMOUS CELL CARCINOMA OF PYRIFORM SINUS (H): ICD-10-CM

## 2023-12-05 PROCEDURE — 99214 OFFICE O/P EST MOD 30 MIN: CPT | Performed by: OTOLARYNGOLOGY

## 2023-12-05 RX ORDER — LEVOTHYROXINE SODIUM 75 UG/1
75 TABLET ORAL DAILY
COMMUNITY

## 2023-12-05 ASSESSMENT — PAIN SCALES - GENERAL: PAINLEVEL: NO PAIN (0)

## 2023-12-05 NOTE — PATIENT INSTRUCTIONS
"You were seen in the clinic today by Dr. Lee. If you have any questions or concerns after your appointment, please call the clinic at [phone]. Press \"1\" for scheduling, press \"3\" for nurse advice.    2.   The following has been recommended for you based upon your appointment today:   -CT neck    3.   Plan to return to the clinic in 2 weeks with phone visit     Shala HENDERSON, RN  [facility]  Department of Otolaryngology  [phone]     "

## 2023-12-05 NOTE — PROGRESS NOTES
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 He reports persistent dysphagia even though he has been following up with swallow therapy.    Review of Systems:   Pertinent items are noted in HPI or as in patient entered ROS below, remainder of complete ROS is negative.       2021     2:50 PM    ENT ROS   Ears, Nose, Throat Nasal congestion or drainage    Trouble swallowing   Cardiopulmonary Cough   Gastrointestinal/Genitourinary Heartburn/indigestion    Constipation   Endocrine Frequent urination        Physical Exam:   /78 (BP Location: Right arm, Patient Position: Sitting, Cuff Size: Adult Regular)   Pulse 72   Temp 98.1  F (36.7  C) (Temporal)   Ht 1.651 m (5' 5\")   Wt 69.6 kg (153 lb 8 oz)   SpO2 100%   BMI 25.54 kg/m       PHYSICAL EXAMINATION:    Constitutional:  The patient was accompanied, well-groomed, and in no acute distress.    Skin:  Warm and pink.    Neurologic:  Alert and oriented x 3.  CN's III-XII within normal limits.  Voice normal.   Psychiatric:  The patient's affect was calm, cooperative, and appropriate.    Respiratory:  Breathing comfortably without stridor or exertion of accessory muscles.    OC/OP:  Normal tongue, floor of mouth, buccal mucosa, and palate.  No lesions or masses on inspection or palpation.  No abnormal lymph tissue in the oropharynx.  The pterygoid region is non-tender.    Neck:  Supple with normal laryngeal and tracheal landmarks.  The parotid beds were without masses.  No palpable thyroid.  Lymphatic:  There is no palpable lymphadenopathy in the neck.     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 Healing area on biopsy of roof of mouth on the righrt side. Presents with a swallowing problem. Will order a CT scan of neck and call with results in a couple weeks, want to see him in about 6-8 weeks to reexamine the mouth.      Follow-up: 2 weeks with phone visit        Scribe Disclosure:   I, Leonel Cummings, am serving as a scribe; to document services personally performed by Jhoan Lee MD -based on data collection and the provider's statements to me.     Provider Disclosure:  I agree with above History, Review of Systems, Physical exam and Plan.  I have reviewed the content of the documentation and have edited it as needed. I have personally performed the services documented here and the documentation accurately represents those services and the decisions I have made.      Electronically signed by:  Jhoan Lee MD

## 2023-12-05 NOTE — NURSING NOTE
"Chief Complaint   Patient presents with    RECHECK    Blood pressure 122/78, pulse 72, temperature 98.1  F (36.7  C), temperature source Temporal, height 1.651 m (5' 5\"), weight 69.6 kg (153 lb 8 oz), SpO2 100%. Bernarda Crow LPN    "

## 2023-12-05 NOTE — LETTER
"2023       RE: Chema Morales  [address]  [facility] [address]     Dear Colleague,    Thank you for referring your patient, Chema Morales, to the [facility] EAR NOSE AND THROAT CLINIC [place] at [facility]. Please see a copy of my visit note below.      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 He reports persistent dysphagia even though he has been following up with swallow therapy.    Review of Systems:   Pertinent items are noted in HPI or as in patient entered ROS below, remainder of complete ROS is negative.       2021     2:50 PM    ENT ROS   Ears, Nose, Throat Nasal congestion or drainage    Trouble swallowing   Cardiopulmonary Cough   Gastrointestinal/Genitourinary Heartburn/indigestion    Constipation   Endocrine Frequent urination        Physical Exam:   /78 (BP Location: Right arm, Patient Position: Sitting, Cuff Size: Adult Regular)   Pulse 72   Temp 98.1  F (36.7  C) (Temporal)   Ht 1.651 m (5' 5\")   Wt 69.6 kg (153 lb 8 oz)   SpO2 100%   BMI 25.54 kg/m       PHYSICAL EXAMINATION:    Constitutional:  The patient was accompanied, well-groomed, and in no acute distress.    Skin:  Warm and pink.    Neurologic:  Alert and oriented x 3.  CN's III-XII within normal limits.  Voice normal.   Psychiatric:  The patient's affect was calm, cooperative, and appropriate.    Respiratory:  Breathing comfortably without stridor or exertion of accessory muscles.    OC/OP:  Normal tongue, floor of mouth, buccal mucosa, and palate.  No lesions or masses on inspection or palpation.  No abnormal lymph " tissue in the oropharynx.  The pterygoid region is non-tender.    Neck:  Supple with normal laryngeal and tracheal landmarks.  The parotid beds were without masses.  No palpable thyroid.  Lymphatic:  There is no palpable lymphadenopathy in the neck.     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 Healing area on biopsy of roof of mouth on the righrt side. Presents with a swallowing problem. Will order a CT scan of neck and call with results in a couple weeks, want to see him in about 6-8 weeks to reexamine the mouth.      Follow-up: 2 weeks with phone visit        Scribe Disclosure:   I, Leonel Cummings, am serving as a scribe; to document services personally performed by Jhoan Lee MD -based on data collection and the provider's statements to me.     Provider Disclosure:  I agree with above History, Review of Systems, Physical exam and Plan.  I have reviewed the content of the documentation and have edited it as needed. I have personally performed the services documented here and the documentation accurately represents those services and the decisions I have made.      Electronically signed by:  Jhoan Lee MD            Again, thank you for allowing me to participate in the care of your patient.      Sincerely,    Jhoan Lee MD

## 2023-12-07 ENCOUNTER — TELEPHONE (OUTPATIENT)
Dept: OTOLARYNGOLOGY | Facility: CLINIC | Age: 83
End: 2023-12-07
Payer: COMMERCIAL

## 2023-12-07 DIAGNOSIS — R13.12 OROPHARYNGEAL DYSPHAGIA: Primary | ICD-10-CM

## 2023-12-07 RX ORDER — METHYLPREDNISOLONE 32 MG/1
TABLET ORAL
Qty: 2 TABLET | Refills: 0 | Status: SHIPPED | OUTPATIENT
Start: 2023-12-07

## 2023-12-07 NOTE — TELEPHONE ENCOUNTER
[facility] Call Center    Phone Message    May a detailed message be left on voicemail: yes     Reason for Call: Medication Question or concern regarding medication   Prescription Clarification  Name of Medication: Medrol  Prescribing Provider: Dr. Lee   Pharmacy: [facility] on      What on the order needs clarification? The pt is having a CT scan tomorrow morning. He has had a reaction to the dye in the past so he takes a medication to alleviate that. The pt needs an re of methylPREDNISolone (MEDROL) 32 MG tablet asap sent to [facility] in [place] on [address] . The instructions say: Si tablet PO 12 hours before CT and 1 tablet PO 2 hours before CT. Please send the pt a My Chart to confirm. Thanks.      Action Taken: Message routed to:  [facility] ENT    Travel Screening: Not Applicable

## 2023-12-08 ENCOUNTER — HOSPITAL ENCOUNTER (OUTPATIENT)
Dept: CT IMAGING | Facility: CLINIC | Age: 83
Discharge: HOME | End: 2023-12-08
Attending: OTOLARYNGOLOGY | Admitting: OTOLARYNGOLOGY
Payer: COMMERCIAL

## 2023-12-08 DIAGNOSIS — C12 SQUAMOUS CELL CARCINOMA OF PYRIFORM SINUS (H): ICD-10-CM

## 2023-12-08 DIAGNOSIS — R13.12 OROPHARYNGEAL DYSPHAGIA: ICD-10-CM

## 2023-12-08 PROCEDURE — 250N000011 HC RX IP 250 OP 636: Performed by: OTOLARYNGOLOGY

## 2023-12-08 PROCEDURE — 70491 CT SOFT TISSUE NECK W/DYE: CPT | Mod: 26 | Performed by: RADIOLOGY

## 2023-12-08 PROCEDURE — 70491 CT SOFT TISSUE NECK W/DYE: CPT

## 2023-12-08 RX ORDER — IOPAMIDOL 755 MG/ML
90 INJECTION, SOLUTION INTRAVASCULAR ONCE
Status: COMPLETED | OUTPATIENT
Start: 2023-12-08 | End: 2023-12-08

## 2023-12-08 RX ADMIN — IOPAMIDOL 90 ML: 755 INJECTION, SOLUTION INTRAVENOUS at 07:54

## 2023-12-19 ENCOUNTER — VIRTUAL VISIT (OUTPATIENT)
Dept: OTOLARYNGOLOGY | Facility: CLINIC | Age: 83
End: 2023-12-19
Payer: COMMERCIAL

## 2023-12-19 DIAGNOSIS — R13.12 OROPHARYNGEAL DYSPHAGIA: Primary | ICD-10-CM

## 2023-12-19 PROCEDURE — 99442 PR PHYSICIAN TELEPHONE EVALUATION 11-20 MIN: CPT | Mod: 95 | Performed by: OTOLARYNGOLOGY

## 2023-12-19 NOTE — PROGRESS NOTES
We spoke with the patient and his son today.  He had a throat cancer many years ago and has been left with persistent pain that is newly developed over the past few months in the area of his original cancer.  We did not see anything exam in clinic but because of the similarity of his symptoms with his old cancer we felt that we would get a CAT scan CAT scan of the entire area not reveal further issues within the neck.  Therefore what we will do is see him again in about a month or so.  We were on the phone with him today for approximately [phone] and we additionally reviewed the chart reviewed the scans for a total of about a 15-minute time spent with the patient or his chart today we will see him again in about 1 month.

## 2023-12-19 NOTE — PATIENT INSTRUCTIONS
"You were seen in the clinic today by Dr. Lee. If you have any questions or concerns after your appointment, please call the clinic at [phone]. Press \"1\" for scheduling, press \"3\" for nurse advice.      2.   Plan to return to the clinic in as scheduled in January     Shala HENDERSON, RN  [facility]  Department of Otolaryngology  [phone]     "

## 2023-12-19 NOTE — LETTER
12/19/2023       RE: Chema Morales  [address]  [facility] [address]     Dear Colleague,    Thank you for referring your patient, Chema Morales, to the [facility] EAR NOSE AND THROAT CLINIC [place] at [facility]. Please see a copy of my visit note below.    We spoke with the patient and his son today.  He had a throat cancer many years ago and has been left with persistent pain that is newly developed over the past few months in the area of his original cancer.  We did not see anything exam in clinic but because of the similarity of his symptoms with his old cancer we felt that we would get a CAT scan CAT scan of the entire area not reveal further issues within the neck.  Therefore what we will do is see him again in about a month or so.  We were on the phone with him today for approximately [phone] and we additionally reviewed the chart reviewed the scans for a total of about a 15-minute time spent with the patient or his chart today we will see him again in about 1 month.      Again, thank you for allowing me to participate in the care of your patient.      Sincerely,    Jhona Lee MD

## 2024-01-16 ENCOUNTER — OFFICE VISIT (OUTPATIENT)
Dept: OTOLARYNGOLOGY | Facility: CLINIC | Age: 84
End: 2024-01-16
Payer: COMMERCIAL

## 2024-01-16 DIAGNOSIS — R13.12 OROPHARYNGEAL DYSPHAGIA: Primary | ICD-10-CM

## 2024-01-16 PROCEDURE — 99213 OFFICE O/P EST LOW 20 MIN: CPT | Performed by: OTOLARYNGOLOGY

## 2024-01-16 ASSESSMENT — PAIN SCALES - GENERAL: PAINLEVEL: NO PAIN (0)

## 2024-01-16 NOTE — LETTER
2024       RE: Chema Morales  [address]  [facility] [address]     Dear Colleague,    Thank you for referring your patient, Chema Morales, to the [facility] EAR NOSE AND THROAT CLINIC [place] at [facility]. Please see a copy of my visit note below.      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  He had high grade dysplasia and a mass that was removed about 6-8 weeks ago. HE states his mouth is finally healed up.      Review of Systems:   Pertinent items are noted in HPI or as in patient entered ROS below, remainder of complete ROS is negative.       2021     2:50 PM    ENT ROS   Ears, Nose, Throat Nasal congestion or drainage    Trouble swallowing   Cardiopulmonary Cough   Gastrointestinal/Genitourinary Heartburn/indigestion    Constipation   Endocrine Frequent urination        Physical Exam:   There were no vitals taken for this visit.     PHYSICAL EXAMINATION:    Constitutional:  The patient was accompanied, well-groomed, and in no acute distress.    Skin:  Warm and pink.    Neurologic:  Alert and oriented x 3.  CN's III-XII within normal limits.  Voice normal.   Psychiatric:  The patient's affect was calm, cooperative, and appropriate.    Respiratory:  Breathing comfortably without stridor or exertion of accessory muscles.   OC/OP:  Normal tongue, floor of mouth, buccal mucosa, and palate.  No lesions or masses on inspection or palpation.  No abnormal lymph tissue in the oropharynx. The pterygoid region is non-tender. Small amount of hyperkeratosis on his gums.  Neck:  Supple with normal laryngeal and  tracheal landmarks.  The parotid beds were without masses.  No palpable thyroid.  Lymphatic:  There is no palpable lymphadenopathy in the neck.     Imaging: CT SOFT TISSUE NECK W CONTRAST 12/8/2023   Impression:  Primary: NI-RADS 1} Expected post-treatment changes in the neck  without evidence of recurrent disease at the primary site.  Neck: NI-RADS 1}  No abnormal lymph node.      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 On exam, his mouth appeared normal although he does have a small amount of hyperkeratosis on his gums. 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         Scribe Disclosure:   I, Leonel Cummings, am serving as a scribe; to document services personally performed by Jhoan Lee MD -based on data collection and the provider's statements to me.     Provider Disclosure:  I agree with above History, Review of Systems, Physical exam and Plan.  I have reviewed the content of the documentation and have edited it as needed. I have personally performed the services documented here and the documentation accurately represents those services and the decisions I have made.      Electronically signed by:  Jhoan eLe MD

## 2024-01-16 NOTE — PROGRESS NOTES
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  He had high grade dysplasia and a mass that was removed about 6-8 weeks ago. HE states his mouth is finally healed up.      Review of Systems:   Pertinent items are noted in HPI or as in patient entered ROS below, remainder of complete ROS is negative.       2021     2:50 PM    ENT ROS   Ears, Nose, Throat Nasal congestion or drainage    Trouble swallowing   Cardiopulmonary Cough   Gastrointestinal/Genitourinary Heartburn/indigestion    Constipation   Endocrine Frequent urination        Physical Exam:   There were no vitals taken for this visit.     PHYSICAL EXAMINATION:    Constitutional:  The patient was accompanied, well-groomed, and in no acute distress.    Skin:  Warm and pink.    Neurologic:  Alert and oriented x 3.  CN's III-XII within normal limits.  Voice normal.   Psychiatric:  The patient's affect was calm, cooperative, and appropriate.    Respiratory:  Breathing comfortably without stridor or exertion of accessory muscles.   OC/OP:  Normal tongue, floor of mouth, buccal mucosa, and palate.  No lesions or masses on inspection or palpation.  No abnormal lymph tissue in the oropharynx. The pterygoid region is non-tender. Small amount of hyperkeratosis on his gums.  Neck:  Supple with normal laryngeal and tracheal landmarks.  The parotid beds were without masses.  No palpable thyroid.  Lymphatic:  There is no palpable lymphadenopathy in the neck.     Imaging: CT SOFT TISSUE NECK W CONTRAST 2023   Impression:  Primary: NI-RADS 1} Expected post-treatment changes in the neck  without evidence of recurrent disease at the primary  site.  Neck: NI-RADS 1}  No abnormal lymph node.      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 On exam, his mouth appeared normal although he does have a small amount of hyperkeratosis on his gums. 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         Scribe Disclosure:   I, Leonel Cummings, am serving as a scribe; to document services personally performed by Jhoan Lee MD -based on data collection and the provider's statements to me.     Provider Disclosure:  I agree with above History, Review of Systems, Physical exam and Plan.  I have reviewed the content of the documentation and have edited it as needed. I have personally performed the services documented here and the documentation accurately represents those services and the decisions I have made.      Electronically signed by:  Jhoan Lee MD

## 2024-03-08 NOTE — TELEPHONE ENCOUNTER
I tried calling this patient to schedule the Video Swallow Study ordered by Dr. Lee.   (E4) spontaneous

## 2024-05-21 ENCOUNTER — OFFICE VISIT (OUTPATIENT)
Dept: OTOLARYNGOLOGY | Facility: CLINIC | Age: 84
End: 2024-05-21
Payer: COMMERCIAL

## 2024-05-21 VITALS
HEART RATE: 74 BPM | HEIGHT: 65 IN | OXYGEN SATURATION: 97 % | BODY MASS INDEX: 25.41 KG/M2 | WEIGHT: 152.5 LBS | SYSTOLIC BLOOD PRESSURE: 146 MMHG | DIASTOLIC BLOOD PRESSURE: 84 MMHG

## 2024-05-21 DIAGNOSIS — C12 SQUAMOUS CELL CARCINOMA OF PYRIFORM SINUS (H): Primary | ICD-10-CM

## 2024-05-21 PROCEDURE — 99213 OFFICE O/P EST LOW 20 MIN: CPT | Performed by: OTOLARYNGOLOGY

## 2024-05-21 ASSESSMENT — PAIN SCALES - GENERAL: PAINLEVEL: NO PAIN (0)

## 2024-05-21 NOTE — PATIENT INSTRUCTIONS
You were seen in the ENT Clinic today by Dr. Lee. If you have any questions or concerns after your appointment, please contact us (see below)      2.   Plan to return to the ENT clinic in August           How to Contact Us:  Send a Cloud[employer] message to your provider. Our team will respond to you via CloudShare. Occasionally, we will need to call you to get further information.  For urgent matters (Monday-Friday), call the ENT Clinic: [phone] and speak with a call center team member - they will route your call appropriately.   If you'd like to speak directly with a nurse, please find our contact information below. We do our best to check voicemail frequently throughout the day, and will work to call you back within 1-2 days. For urgent matters, please use the general clinic phone numbers listed above.     RADHA Last  Direct: [phone]  Bernarda VELASQUEZ LPN  Direct: [phone]         [facility]  Department of Otolaryngology

## 2024-05-21 NOTE — PROGRESS NOTES
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 Denies any current use of tobacco products.      Review of Systems:   Pertinent items are noted in HPI or as in patient entered ROS below, remainder of complete ROS is negative.       2021     2:50 PM    ENT ROS   Ears, Nose, Throat Nasal congestion or drainage    Trouble swallowing   Cardiopulmonary Cough   Gastrointestinal/Genitourinary Heartburn/indigestion    Constipation   Endocrine Frequent urination        Physical Exam:   There were no vitals taken for this visit.     PHYSICAL EXAMINATION:    Constitutional:  The patient was unaccompanied, well-groomed, and in no acute distress.    Skin:  Warm and pink.    Neurologic:  Alert and oriented x 3.  CN's III-XII within normal limits.  Voice normal.   Psychiatric:  The patient's affect was calm, cooperative, and appropriate.    Respiratory:  Breathing comfortably without stridor or exertion of accessory muscles.    Eyes: Extraocular movement intact.    Head:  Normocephalic and atraumatic.  No lesions or scars.    Ears:  Pinnae and tragus non-tender.  EAC's and TM's were clear.    Nose:  Sinuses were non-tender.  Anterior rhinoscopy revealed midline septum and absence of purulence or polyps.    OC/OP:  Normal tongue, floor of mouth, buccal mucosa, and palate.  Roof of mouth and right upper gum with leukopklakia lesion, bland appearing.  No lesions or masses on inspection or palpation.  No abnormal lymph tissue in the oropharynx.  The pterygoid region is non-tender.  Small amount of hyperkeratosis on his gums.   Neck:  Supple with  normal laryngeal and tracheal landmarks.  The parotid beds were without masses.  No palpable thyroid.  Lymphatic:  There is no palpable lymphadenopathy in the neck.      Assessment and Plan:  No diagnosis found.      Follow-up: No follow-ups on file.         Scribe Disclosure:   I, Leonel Cummings, am serving as a scribe; to document services personally performed by Jhoan Lee MD -based on data collection and the provider's statements to me.     Provider Disclosure:  I agree with above History, Review of Systems, Physical exam and Plan.  I have reviewed the content of the documentation and have edited it as needed. I have personally performed the services documented here and the documentation accurately represents those services and the decisions I have made.      Electronically signed by:

## 2024-05-21 NOTE — LETTER
2024       RE: Chema Morales  [address]  [facility] [address]     Dear Colleague,    Thank you for referring your patient, Chema Morales, to the [facility] EAR NOSE AND THROAT CLINIC [place] at [facility]. Please see a copy of my visit note below.      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 Denies any current use of tobacco products.      Review of Systems:   Pertinent items are noted in HPI or as in patient entered ROS below, remainder of complete ROS is negative.       2021     2:50 PM    ENT ROS   Ears, Nose, Throat Nasal congestion or drainage    Trouble swallowing   Cardiopulmonary Cough   Gastrointestinal/Genitourinary Heartburn/indigestion    Constipation   Endocrine Frequent urination        Physical Exam:   There were no vitals taken for this visit.     PHYSICAL EXAMINATION:    Constitutional:  The patient was unaccompanied, well-groomed, and in no acute distress.    Skin:  Warm and pink.    Neurologic:  Alert and oriented x 3.  CN's III-XII within normal limits.  Voice normal.   Psychiatric:  The patient's affect was calm, cooperative, and appropriate.    Respiratory:  Breathing comfortably without stridor or exertion of accessory muscles.    Eyes: Extraocular movement intact.    Head:  Normocephalic and atraumatic.  No lesions or scars.    Ears:  Pinnae and tragus non-tender.  EAC's and TM's were clear.    Nose:  Sinuses were non-tender.  Anterior rhinoscopy revealed midline septum and absence of purulence or polyps.    OC/OP:  Normal  tongue, floor of mouth, buccal mucosa, and palate.  Roof of mouth and right upper gum with leukopklakia lesion, bland appearing.  No lesions or masses on inspection or palpation.  No abnormal lymph tissue in the oropharynx.  The pterygoid region is non-tender.  Small amount of hyperkeratosis on his gums.   Neck:  Supple with normal laryngeal and tracheal landmarks.  The parotid beds were without masses.  No palpable thyroid.  Lymphatic:  There is no palpable lymphadenopathy in the neck.      Assessment and Plan:  No diagnosis found.      Follow-up: No follow-ups on file.         Scribe Disclosure:   I, Leonel Cummings, am serving as a scribe; to document services personally performed by Jhoan Lee MD -based on data collection and the provider's statements to me.     Provider Disclosure:  I agree with above History, Review of Systems, Physical exam and Plan.  I have reviewed the content of the documentation and have edited it as needed. I have personally performed the services documented here and the documentation accurately represents those services and the decisions I have made.      Electronically signed by:      Again, thank you for allowing me to participate in the care of your patient.      Sincerely,    Jhoan Lee MD

## 2024-08-06 ENCOUNTER — OFFICE VISIT (OUTPATIENT)
Dept: OTOLARYNGOLOGY | Facility: CLINIC | Age: 84
End: 2024-08-06
Payer: COMMERCIAL

## 2024-08-06 VITALS
HEART RATE: 70 BPM | SYSTOLIC BLOOD PRESSURE: 122 MMHG | BODY MASS INDEX: 25.39 KG/M2 | WEIGHT: 152.4 LBS | HEIGHT: 65 IN | DIASTOLIC BLOOD PRESSURE: 76 MMHG | OXYGEN SATURATION: 96 %

## 2024-08-06 DIAGNOSIS — C12 SQUAMOUS CELL CARCINOMA OF PYRIFORM SINUS (H): ICD-10-CM

## 2024-08-06 PROCEDURE — 99214 OFFICE O/P EST MOD 30 MIN: CPT | Mod: 25 | Performed by: OTOLARYNGOLOGY

## 2024-08-06 PROCEDURE — 31575 DIAGNOSTIC LARYNGOSCOPY: CPT | Performed by: OTOLARYNGOLOGY

## 2024-08-06 ASSESSMENT — PAIN SCALES - GENERAL: PAINLEVEL: NO PAIN (0)

## 2024-08-06 NOTE — PROGRESS NOTES
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    On physical examination today patient is alert and oriented x 3 and pleasant skin to face of his neck at him is otherwise normal and there is no adenopathy in his neck 0 His oropharynx is examined along the dental arch on both sides he does indeed have some hyperkeratosis is present on the dental arch on both sides is present in the left cheek is present in the right cheek there is a small area the right cheek with even excoriation but in palpating all these areas they do not have any pain they do not and they feel very benign and they have not changed in fact they are about as good as they were when I saw him last May.  After topical anesthesia also did a flexible direct laryngoscopy nasal cavity nasopharynx OROPHARYNX and hypopharynx are clear true vocal cords show good mobility on both sides and the piriform sinuses are likewise clear.    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

## 2024-08-06 NOTE — LETTER
8/6/2024       RE: Chema Morales  [address]  [facility] [address]     Dear Colleague,    Thank you for referring your patient, Chema Morales, to the [facility] EAR NOSE AND THROAT CLINIC [place] at [facility]. Please see a copy of my visit note below.    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    On physical examination today patient is alert and oriented x 3 and pleasant skin to face of his neck at him is otherwise normal and there is no adenopathy in his neck 0 His oropharynx is examined along the dental arch on both sides he does indeed have some hyperkeratosis is present on the dental arch on both sides is present in the left cheek is present in the right cheek there is a small area the right cheek with even excoriation but in palpating all these areas they do not have any pain they do not and they feel very benign and they have not changed in fact they are about as good as they were when I saw him last May.  After topical anesthesia also did a flexible direct laryngoscopy nasal cavity nasopharynx OROPHARYNX and hypopharynx are clear true vocal cords show good mobility on both sides and the piriform sinuses are likewise clear.    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      Sincerely,    Jhoan Bloom  MD Jesus

## 2024-11-24 ENCOUNTER — HEALTH MAINTENANCE LETTER (OUTPATIENT)
Age: 84
End: 2024-11-24

## 2025-02-03 NOTE — PROGRESS NOTES
"  Otolaryngology Clinic    Name: Chema Morales  MRN: [id number]  Age: 84 year old  : 2025      Chief Complaint:   Follow up     History of Present Illness:   Chema Morales is a 84 year old male with a history of oral leukoplakia and squamous cell carcinoma of pyriform sinus who presents for follow up. Today, his son reports that he got a pill stuck in his throat.         Review of Systems:   Pertinent items are noted in HPI or as in patient entered ROS below, remainder of complete ROS is negative.       2025     8:00 AM    ENT ROS   Cardiopulmonary Cough   Gastrointestinal/Genitourinary Heartburn/indigestion    Constipation   Musculoskeletal Back pain   Endocrine Frequent urination        Physical Exam:   BP (!) 159/82 (BP Location: Right arm, Patient Position: Sitting, Cuff Size: Adult Regular)   Pulse 77   Ht 1.651 m (5' 5\")   Wt 69.2 kg (152 lb 8 oz)   SpO2 97%   BMI 25.38 kg/m       PHYSICAL EXAMINATION:    Constitutional:  The patient was accompanied by his son, well-groomed, and in no acute distress.    Skin:  Warm and pink.    Neurologic:  Alert and oriented x 3.  CN's III-XII within normal limits.  Voice normal.   Psychiatric:  The patient's affect was calm, cooperative, and appropriate.    Respiratory:  Breathing comfortably without stridor or exertion of accessory muscles.    Eyes: Extraocular movement intact.    Head:  Normocephalic and atraumatic.  No lesions or scars.    Ears:  Pinnae and tragus non-tender.  EACs and TMs were clear.    Nose:  Sinuses were non-tender.  Anterior rhinoscopy revealed midline septum and absence of purulence or polyps.    OC/OP:  Normal tongue, floor of mouth, buccal mucosa, and palate.  No lesions or masses on inspection or palpation.  No abnormal lymph tissue in the oropharynx.  The pterygoid region is non-tender.  Edentulous 1 cm true leukoplakic lesion of the right upper dental arch without bleeding or tenderness.   Neck:  Supple " with normal laryngeal and tracheal landmarks.  The parotid beds were without masses.  No palpable thyroid.  Lymphatic:  There is no palpable lymphadenopathy in the neck.     Flexible Fiberoptic Nasopharyngoscopy:  Consent for fiberoptic laryngoscopy was obtained and I confirmed correctness of procedure and identity of patient. Fiberoptic laryngoscopy was indicated due to history of oral cancer. The nose was topically decongested and anesthetized. The fiberoptic laryngoscope was passed through the naris under endoscopic vision. The turbinates were normal. The inferior and middle meati were clear without purulence, masses, or polyps. The nasopharynx was clear. The Eustachian tubes were clear. The soft palate appeared normal with good mobility. The epiglottis was sharp and the visualized portion of the vallecula was clear. The larynx was clear with mobile cords, but there was forceful closure of the larynx during speaking. The arytenoids were clear and there was no pooling in the hypopharynx. The scope was then removed and the procedure was terminated without incident.       Assessment and Plan:    ICD-10-CM    1. Squamous cell carcinoma of pyriform sinus (H)  C12 Adult ENT Clinic Follow-up Order (Blank)        The patient continues to do well, however his exam showed a 1.2 cm leukoplakic lesion around the dental arch.  A scope was performed today, and it looked good. There was some stress in his voice box.  We discussed doing a biopsy and depending on the results, we also discussed the pioglitazone-metformin combination treatment trial. Regarding his difficulty swallowing and getting pills stuck in his throat, discussed that he will need a swallow study. I would like him to follow up with me in 3 months. The family will discuss the trial and email me to let me know their decision. The patient plans to visit [facility] and will be gone for several months.     Follow-up: in 3 months         Scribe Disclosure:  Joycelyn ROBLEDO  Ab am serving as a scribe to document services personally performed by Jhoan Lee MD at this visit, based upon the provider's statements to me. All documentation has been reviewed by the aforementioned provider prior to being entered into the official medical record.

## 2025-02-04 ENCOUNTER — OFFICE VISIT (OUTPATIENT)
Dept: OTOLARYNGOLOGY | Facility: CLINIC | Age: 85
End: 2025-02-04
Payer: COMMERCIAL

## 2025-02-04 VITALS
HEART RATE: 77 BPM | OXYGEN SATURATION: 97 % | WEIGHT: 152.5 LBS | BODY MASS INDEX: 25.41 KG/M2 | HEIGHT: 65 IN | DIASTOLIC BLOOD PRESSURE: 82 MMHG | SYSTOLIC BLOOD PRESSURE: 159 MMHG

## 2025-02-04 DIAGNOSIS — C12 SQUAMOUS CELL CARCINOMA OF PYRIFORM SINUS (H): ICD-10-CM

## 2025-02-04 DIAGNOSIS — R13.12 OROPHARYNGEAL DYSPHAGIA: Primary | ICD-10-CM

## 2025-02-04 PROCEDURE — 99213 OFFICE O/P EST LOW 20 MIN: CPT | Mod: 25 | Performed by: OTOLARYNGOLOGY

## 2025-02-04 PROCEDURE — 92511 NASOPHARYNGOSCOPY: CPT | Performed by: OTOLARYNGOLOGY

## 2025-02-04 ASSESSMENT — PAIN SCALES - GENERAL: PAINLEVEL_OUTOF10: MILD PAIN (2)

## 2025-02-04 NOTE — LETTER
"2025       RE: Chema Morales  [address]  [place] [facility] [place] [address]     Dear Colleague,    Thank you for referring your patient, Chema Morales, to the [facility] [place] [facility]. Please see a copy of my visit note below.      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 Today, his son reports that he got a pill stuck in his throat.         Review of Systems:   Pertinent items are noted in HPI or as in patient entered ROS below, remainder of complete ROS is negative.       2025     8:00 AM    ENT ROS   Cardiopulmonary Cough   Gastrointestinal/Genitourinary Heartburn/indigestion    Constipation   Musculoskeletal Back pain   Endocrine Frequent urination        Physical Exam:   BP (!) 159/82 (BP Location: Right arm, Patient Position: Sitting, Cuff Size: Adult Regular)   Pulse 77   Ht 1.651 m (5' 5\")   Wt 69.2 kg (152 lb 8 oz)   SpO2 97%   BMI 25.38 kg/m       PHYSICAL EXAMINATION:    Constitutional:  The patient was accompanied by his son, well-groomed, and in no acute distress.    Skin:  Warm and pink.    Neurologic:  Alert and oriented x 3.  CN's III-XII within normal limits.  Voice normal.   Psychiatric:  The patient's affect was calm, cooperative, and appropriate.    Respiratory:  Breathing comfortably without stridor or exertion of accessory muscles.    Eyes: Extraocular movement intact.    Head:  Normocephalic and atraumatic.  No lesions or scars.    Ears:  Pinnae and tragus non-tender.  EACs and TMs were clear.    Nose:  Sinuses were non-tender.  Anterior rhinoscopy revealed midline septum and absence of purulence or " polyps.    OC/OP:  Normal tongue, floor of mouth, buccal mucosa, and palate.  No lesions or masses on inspection or palpation.  No abnormal lymph tissue in the oropharynx.  The pterygoid region is non-tender.  Edentulous 1 cm true leukoplakic lesion of the right upper dental arch without bleeding or tenderness.   Neck:  Supple with normal laryngeal and tracheal landmarks.  The parotid beds were without masses.  No palpable thyroid.  Lymphatic:  There is no palpable lymphadenopathy in the neck.     Flexible Fiberoptic Nasopharyngoscopy:  Consent for fiberoptic laryngoscopy was obtained and I confirmed correctness of procedure and identity of patient. Fiberoptic laryngoscopy was indicated due to history of oral cancer. The nose was topically decongested and anesthetized. The fiberoptic laryngoscope was passed through the naris under endoscopic vision. The turbinates were normal. The inferior and middle meati were clear without purulence, masses, or polyps. The nasopharynx was clear. The Eustachian tubes were clear. The soft palate appeared normal with good mobility. The epiglottis was sharp and the visualized portion of the vallecula was clear. The larynx was clear with mobile cords, but there was forceful closure of the larynx during speaking. The arytenoids were clear and there was no pooling in the hypopharynx. The scope was then removed and the procedure was terminated without incident.       Assessment and Plan:    ICD-10-CM    1. Squamous cell carcinoma of pyriform sinus (H)  C12 Adult ENT Clinic Follow-up Order (Blank)        The patient continues to do well, however his exam showed a 1.2 cm leukoplakic lesion around the dental arch.  A scope was performed today, and it looked good. There was some stress in his voice box.  We discussed doing a biopsy and depending on the results, we also discussed the pioglitazone-metformin combination treatment trial. Regarding his difficulty swallowing and getting pills stuck  in his throat, discussed that he will need a swallow study. I would like him to follow up with me in 3 months. The family will discuss the trial and email me to let me know their decision. The patient plans to visit [facility] and will be gone for several months.     Follow-up: in 3 months         Scribe Disclosure:  I, Joycelyn Berger, am serving as a scribe to document services personally performed by Jhoan Lee MD at this visit, based upon the provider's statements to me. All documentation has been reviewed by the aforementioned provider prior to being entered into the official medical record.          Again, thank you for allowing me to participate in the care of your patient.      Sincerely,    Jhoan Lee MD

## 2025-02-28 ENCOUNTER — THERAPY VISIT (OUTPATIENT)
Dept: SPEECH THERAPY | Facility: CLINIC | Age: 85
End: 2025-02-28
Payer: COMMERCIAL

## 2025-02-28 ENCOUNTER — ANCILLARY PROCEDURE (OUTPATIENT)
Dept: GENERAL RADIOLOGY | Facility: CLINIC | Age: 85
End: 2025-02-28
Attending: OTOLARYNGOLOGY
Payer: COMMERCIAL

## 2025-02-28 DIAGNOSIS — R13.12 OROPHARYNGEAL DYSPHAGIA: ICD-10-CM

## 2025-02-28 PROCEDURE — 74230 X-RAY XM SWLNG FUNCJ C+: CPT | Mod: GC | Performed by: RADIOLOGY

## 2025-02-28 RX ORDER — BARIUM SULFATE 400 MG/ML
SUSPENSION ORAL ONCE
Status: COMPLETED | OUTPATIENT
Start: 2025-02-28 | End: 2025-02-28

## 2025-02-28 RX ADMIN — BARIUM SULFATE: 400 SUSPENSION ORAL at 14:11

## 2025-02-28 NOTE — PROGRESS NOTES
SPEECH LANGUAGE PATHOLOGY EVALUATION    Subjective        Presenting condition or subjective complaint: Swallowing big tablets such as mucinex cough medicine  Date of onset: 02/28/25    Relevant medical history: Asthma; Bladder or bowel problems; Cancer; COPD; Implanted device; Radiation treatment; Smoking; Thyroid problems   Dates & types of surgery: Neck surgery to remove lymph in the left side after my throat surgery    Prior diagnostic imaging/testing results: Video fluoroscopic swallow study     Prior therapy history for the same diagnosis, illness or injury: Yes Strengthen throat muscle therapy since two years    Living Environment  Social support: With family members   Help at home: Self Cares (home health aide/personal care attendant, family, etc); Home management tasks (cooking, cleaning); Medication and/or finances; Home and Yard maintenance tasks  Equipment owned: Straight Cane     Employment: No    Hobbies/Interests: Gardening, walking, listening music and watching tv    Patient goals for therapy: Don t know. I am using liquid mucinex for my cough    Pain assessment: Pain denied     Objective     SWALLOW EVALUTION  Dysphagia history: Pt has a history of T4N1 SCC of the left pyriform sinus that was initially treatment with concurrent chemoradiation therapy and subsequent neck dissection in 2015. Pt has a history of recurrent pneumonias suspected to be related to aspiration. Last pneumonia was in 2019. He reports difficulty no issues with solids. Reports tablets get stuck. A few times he will drink liquid to try to wash the tablet down and the water comes back up. Reports occasional coughing with liquids. Endorses needing to concentrate during PO intake or he will have more difficulty.     Current Diet/Method of Nutritional Intake: thin liquids (level 0), regular diet      CLINICAL SWALLOW EVALUATION  Oral Motor Function:   Dentition: edentulous, has dentures  Secretion management: WFL  Mucosal quality:  good  Mandibular function: intact  Oral labial function: WFL  Lingual function: WFL  Velar function: intact   Buccal function: intact  Laryngeal function: cough, throat clear, volitional swallow, voicing WFL     Level of assist required for feeding: no assistance needed   Textures Trialed:   Clinical Swallow Eval: Thin Liquids  Mode of presentation: cup   Volume presented: 3 oz  Preparatory Phase: WFL  Oral Phase: WFL  Pharyngeal phase of swallow:  NA    Strategies trialed during procedure: AIDE SLP CLINICAL EVAL STRATEGIES: NA    Diagnostic statement: Pt demonstrates overt clinical s/sx of penetration/aspiration with throat clearing.       VIDEOFLUOROSCOPIC SWALLOW STUDY  Radiologist: Resident  Views Taken: left lateral, A/P   Physical location of procedure: [facility]  Patient sitting upright on chair/stool , & standing for A/P    VFSS textures trialed:   VFSS Eval: Thin Liquids  Mode of Presentation: cup, self-fed   Order of Presentation: 1, 2, 8, 10 (sequential), 11, 12 (sequential, straw), 14 A/P  Preparatory Phase: poor bolus control  Oral Phase: residue in oral cavity, premature pharyngeal entry  Bolus Location When Swallow Initiated: valleculae  Pharyngeal Phase: residue in valleculae, residue in pyriform sinus  Rosenbeck's Penetration Aspiration Scale: 6 - contrast passes glottis, no subglottic residue remains (aspiration)  Response to Aspiration: silent  Strategies and Compensations: n/a  Diagnostic Statement: Pt has intermittent penetration. When penetration occurs there are residuals that remain in the laryngeal vestibule. These residuals then dip below the cords (aspiration) during the swallow and come back above after the swallow. No reflexive response. OF note, no penetration with initial sips. Penetration noted only on liquid wash that followed solid trials, during sequential swallows. No penetration noted when pt took small sips. Pt has poor bolus control leading to premature  pharyngeal entry. Residue noted in oral cavity, valleculae, & at level of pyriform sinus after initial swallow.    VFSS Eval: Mildly Thick Liquids  Mode of Presentation: cup, self-fed   Order of Presentation: 3, 4  Preparatory Phase: poor bolus control  Oral Phase: residue in oral cavity, premature pharyngeal entry  Bolus Location When Swallow Initiated: valleculae  Pharyngeal Phase: residue in valleculae, residue in pyriform sinus   Rosenbeck's Penetration Aspiration Scale: 1 - no aspiration, contrast does not enter airway  Response to Aspiration:  NA  Strategies and Compensations: not applicable  Diagnostic Statement: Pt does not have aspiration or penetration on mildly thick liquid trials. Pt has poor bolus control leading to premature pharyngeal entry. Residue noted in oral cavity, valleculae, & at level of pyriform sinus after initial swallow.    VFSS Eval: Purees  Mode of Presentation: spoon, self-fed   Order of Presentation: 5, 6, 15  Preparatory Phase: prolonged bolus preparation, poor bolus control  Oral Phase: residue in oral cavity, premature pharyngeal entry  Bolus Location When Swallow Initiated: valleculae  Pharyngeal Phase: residue in valleculae, residue in pyriform sinus  Rosenbeck s Penetration Aspiration Scale: 1 - no aspiration, contrast does not enter airway  Response to Aspiration:  NA  Strategies and Compensations: not applicable  Diagnostic Statement: Pt has no aspiration or penetration during puree trials. Pt has poor bolus control leading to premature pharyngeal entry. Residue noted in oral cavity, valleculae, & at level of pyriform sinus after initial swallow. Second swallow does not clear residue at any location.    VFSS Eval: Solids  Mode of Presentation: self-fed   Order of Presentation: 7, 9  Preparatory Phase: prolonged bolus preparation, poor bolus control  Oral Phase: residue in oral cavity, premature pharyngeal entry  Bolus Location When Swallow Initiated: valleculae  Pharyngeal  Phase: residue in valleculae, residue in pyriform sinus   Rosenbeck s Penetration Aspiration Scale: 1 - no aspiration, contrast does not enter airway  Response to Aspiration:  NA  Strategies and Compensations: not applicable  Diagnostic Statement: Pt has no aspiration or penetration during solid trials. Pt has poor bolus control leading to premature pharyngeal entry. Residue noted in oral cavity, valleculae, & at level of pyriform sinus after initial swallow. Second swallow does not clear residue at any location.    VFSS Eval: Barium Tablet  Mode of Presentation: self-fed   Order of Presentation: 13  Preparatory Phase:  unknown, pt tilted head back and was out of frame   Oral Phase: unknown, pt tilted head back and was out of frame   Bolus Location When Swallow Initiated: unknown, pt tilted head back and was out of frame   Diagnostic Statement: Tablet passed through the oropharynx without difficulty. However, tablet then became stuck in the PE segment. Pill continued to be stagnant in the PE segment and could be visualized during AP trials of puree and thin liquids. Pill did not move during AP trials.    ESOPHAGEAL PHASE OF SWALLOW  please refer to radiologist's report for details  Esophagram completed today in conjunction with VFSS      SWALLOW ASSESSMENT CLINICAL IMPRESSIONS AND RATIONALE  Diet Consistency Recommendations: thin liquids (level 0), regular diet    Recommended Feeding/Eating Techniques: small bolus size, slow rate of intake, alternate food and liquid intake, minimize distractions during oral intake,      Medication Administration Recommendations: broken into smaller pieces or crushed if able with thin liquid  Instrumental Assessment Recommendations: instrumental evaluation not recommended at this time     Assessment & Plan   CLINICAL IMPRESSIONS   Medical Diagnosis: Oropharyngeal dysphagia    Treatment Diagnosis: Pharyngoesophageal dysphagia   Impression/Assessment: Pt is a 84 year old male with  dysphagia complaints primarily when swallowing pills. The following significant findings have been identified: impaired swallowing, characterized by poor bolus control leading to premature pharyngeal entry. Pt has intermittent penetration of thin liquids. When penetration occurs there are residuals that remain in the laryngeal vestibule. These residuals then dip below the cords (aspiration) during the swallow and come back above after the swallow. No reflexive response. No penetration noted when pt took small sips. Residue noted in oral cavity, valleculae, & at level of pyriform sinus after initial swallow. Second swallow does not clear residue. Pt also was unable to pass pill and it remained in the PE segment the rest of the assessment. Identified deficits interfere with their ability to  take pills  as compared to previous level of function. Recommend use of safe swallowing strategies, such as small sips and alternating solids and liquids. Patient and son were educated on the anatomy and the results of today's swallow assessment.     PLAN OF CARE  Treatment Interventions: Swallowing dysfunction and/or oral function for feeding    Prognosis to achieve stated therapy goals is fair   Rehab potential is impacted by: current level of function, family/caregiver support, patient motivation, prior level of function    Long Term Goals:   SLP Goal 1  Goal Identifier: PO  Goal Description: Pt will tolerate least restricitive diet while adhering to safe swallow precautions and without pulmonary compromise across 6 months time.  Rationale: To maximize safety, ease and/or independence of oral intake  Target Date: 05/29/25  SLP Goal 2  Goal Identifier: Exercises  Goal Description: Pt will improve and/or maintain ROM and strength of BOT, jaw and pharynx by completing 10 repetitions of 5/5 exercises 3 times daily with minimal written or verbal cues.  Rationale: To maximize safety, ease and/or independence of oral intake  Target  Date: 05/29/25      Frequency of Treatment: 1x/month  Duration of Treatment: 3 months     Recommended Referrals to Other Professionals: ENT for possible PE segment dilation     Education Assessment:   Learner/Method: Patient;Family    Risks and benefits of evaluation/treatment have been explained.   Patient/Family/caregiver agrees with Plan of Care.     Evaluation Time:    SLP Eval: oral/pharyngeal swallow function, clinical minutes (72341): 20  SLP Eval: VideoFluoroscopic Swallow function Minutes (56143): 30      DAVE Trejo,      Signing Clinician: JING Frank      [facility]                                                                                   OUTPATIENT SPEECH LANGUAGE PATHOLOGY      PLAN OF TREATMENT FOR OUTPATIENT REHABILITATION   Patient's Last Name, First Name, HEIDYTAMMI  SaravananChema barbosa YOB: 1940   Provider's Name   [facility]   Medical Record No.  [id number]     Onset Date: 02/28/25 Start of Care Date: 02/28/25     Medical Diagnosis:  Oropharyngeal dysphagia      SLP Treatment Diagnosis: Pharyngoesophageal dysphagia  Plan of Treatment  Frequency/Duration: 1x/month  / 3 months     Certification date from 02/28/25   To 05/29/25          See note for plan of treatment details and functional goals     JING Frank                         I CERTIFY THE NEED FOR THESE SERVICES FURNISHED UNDER        THIS PLAN OF TREATMENT AND WHILE UNDER MY CARE     (Physician attestation of this document indicates review and certification of the therapy plan).              Referring Provider:  Jhoan Lee    Initial Assessment  See Epic Evaluation- 02/28/25

## 2025-03-04 ENCOUNTER — TELEPHONE (OUTPATIENT)
Dept: SPEECH THERAPY | Facility: CLINIC | Age: 85
End: 2025-03-04
Payer: COMMERCIAL

## 2025-03-04 ENCOUNTER — DOCUMENTATION ONLY (OUTPATIENT)
Dept: SPEECH THERAPY | Facility: CLINIC | Age: 85
End: 2025-03-04
Payer: COMMERCIAL

## 2025-03-04 NOTE — PROGRESS NOTES
Video Esophagram Review Rounds  Imaging Review of MBSS conducted with attending physician Zuly Dodson and reviewed/discussed with SLP Karen Montgomery, Niurka Siegel, and/or Reshma Gaspar and with GI representative, REBECCA Albright    Relevant Background: Hx of h/n ca s/p radiation    MBSS Date: 2/28/25    Findings:  Pharyngeal Weakness:Yes  Epiglottic dysfunction: No  Penetration: Yes  Aspiration: Yes  UES dysfunction: Yes: Barium tablet stuck at UES  Details: Barium tablet was not passed from area of narrowing even with large amounts of liquid.       Recommendations:  Follow up with Dr. Lee about possible dilation vs consult with Dr. Dodson for TNE with dilation.

## 2025-03-04 NOTE — TELEPHONE ENCOUNTER
LVM to schedule swallow therapy treatment session at [facility] with any therapist in the next few weeks. [facility] rehab number      [phone]

## 2025-03-06 ENCOUNTER — PATIENT OUTREACH (OUTPATIENT)
Dept: OTOLARYNGOLOGY | Facility: CLINIC | Age: 85
End: 2025-03-06
Payer: COMMERCIAL

## 2025-03-06 NOTE — TELEPHONE ENCOUNTER
LVM for patient to discuss dilation procedure with Dr. Lee. Provided direct call back number for patient to return call to discuss further.    Shala Weaver BSN, RN

## 2025-03-07 ENCOUNTER — PREP FOR PROCEDURE (OUTPATIENT)
Dept: OTOLARYNGOLOGY | Facility: CLINIC | Age: 85
End: 2025-03-07

## 2025-03-07 DIAGNOSIS — R13.12 OROPHARYNGEAL DYSPHAGIA: ICD-10-CM

## 2025-03-07 DIAGNOSIS — C12 SQUAMOUS CELL CARCINOMA OF PYRIFORM SINUS (H): Primary | ICD-10-CM

## 2025-03-11 NOTE — PROGRESS NOTES
The patioen t is hjere for follow up today. No new complaints.     The oral cavity is examioned and is stable the re ia a small leukoplakia lesion that persists and the patient and ingris howard may care for participatioin in a lesion trial.     15 minutes specnt with patient today. Will follow up in 2-3 months.

## 2025-03-12 ENCOUNTER — TELEPHONE (OUTPATIENT)
Dept: OTOLARYNGOLOGY | Facility: CLINIC | Age: 85
End: 2025-03-12
Payer: COMMERCIAL

## 2025-03-12 ENCOUNTER — HOSPITAL ENCOUNTER (OUTPATIENT)
Facility: CLINIC | Age: 85
End: 2025-03-12
Attending: OTOLARYNGOLOGY | Admitting: OTOLARYNGOLOGY
Payer: COMMERCIAL

## 2025-03-12 NOTE — TELEPHONE ENCOUNTER
Scheduled surgery with Dr. Lee on 3/31/2025    Did patient have any scheduling preferences? No    Does patient want a sooner date? No    Spoke with: YARON BLANCAS (Son) - patient answered but handed the phone to his son to schedule    [facility]    Patient will be seen for their H&P by their PCP Omid Matamoros MD within 30 days of surgery - Confirmed PCP on file is up to date     Does patient need a consult before upcoming surgery? No    Anesthesia type: General    Requested Imaging required for surgery: No    Patient is scheduled for their 1 week post op on 4/8/2025 at 855am with Dr. Lee    Patient will receive their surgery packet via [employer] per their preference    Patient was not provided a start time for surgery & is aware they will receive this information 3-5 days before surgery    Additional comments: Patient was instructed to call back with any further questions or concerns.     Emily Jang on 3/12/2025 at 9:54 AM

## 2025-03-25 RX ORDER — DIPHENOXYLATE HYDROCHLORIDE AND ATROPINE SULFATE 2.5; .025 MG/1; MG/1
1 TABLET ORAL DAILY
COMMUNITY
End: 2025-04-07 | Stop reason: HOSPADM

## 2025-03-25 RX ORDER — CHLORAL HYDRATE 500 MG
1000 CAPSULE ORAL
COMMUNITY
End: 2025-04-07 | Stop reason: HOSPADM

## 2025-03-25 RX ORDER — TAMSULOSIN HYDROCHLORIDE 0.4 MG/1
0.4 CAPSULE ORAL DAILY
COMMUNITY
Start: 2024-09-23 | End: 2025-04-07 | Stop reason: HOSPADM

## 2025-03-28 ENCOUNTER — ANESTHESIA EVENT (OUTPATIENT)
Dept: SURGERY | Facility: CLINIC | Age: 85
End: 2025-03-28
Payer: COMMERCIAL

## 2025-03-28 RX ORDER — OXYCODONE HYDROCHLORIDE 5 MG/1
5 TABLET ORAL
OUTPATIENT
Start: 2025-03-28

## 2025-03-28 RX ORDER — HYDROMORPHONE HYDROCHLORIDE 1 MG/ML
0.2 INJECTION, SOLUTION INTRAMUSCULAR; INTRAVENOUS; SUBCUTANEOUS EVERY 5 MIN PRN
OUTPATIENT
Start: 2025-03-28

## 2025-03-28 RX ORDER — HYDRALAZINE HYDROCHLORIDE 20 MG/ML
2.5-5 INJECTION INTRAMUSCULAR; INTRAVENOUS EVERY 10 MIN PRN
OUTPATIENT
Start: 2025-03-28

## 2025-03-28 RX ORDER — ONDANSETRON 4 MG/1
4 TABLET, ORALLY DISINTEGRATING ORAL EVERY 30 MIN PRN
OUTPATIENT
Start: 2025-03-28

## 2025-03-28 RX ORDER — NALOXONE HYDROCHLORIDE 0.4 MG/ML
0.1 INJECTION, SOLUTION INTRAMUSCULAR; INTRAVENOUS; SUBCUTANEOUS
OUTPATIENT
Start: 2025-03-28

## 2025-03-28 RX ORDER — DIPHENHYDRAMINE HCL 12.5 MG/5ML
12.5 SOLUTION ORAL EVERY 6 HOURS PRN
OUTPATIENT
Start: 2025-03-28

## 2025-03-28 RX ORDER — DEXAMETHASONE SODIUM PHOSPHATE 10 MG/ML
4 INJECTION, SOLUTION INTRAMUSCULAR; INTRAVENOUS
OUTPATIENT
Start: 2025-03-28

## 2025-03-28 RX ORDER — LABETALOL HYDROCHLORIDE 5 MG/ML
10 INJECTION, SOLUTION INTRAVENOUS
OUTPATIENT
Start: 2025-03-28

## 2025-03-28 RX ORDER — DIPHENHYDRAMINE HYDROCHLORIDE 50 MG/ML
12.5 INJECTION, SOLUTION INTRAMUSCULAR; INTRAVENOUS EVERY 6 HOURS PRN
OUTPATIENT
Start: 2025-03-28

## 2025-03-28 RX ORDER — HYDROMORPHONE HYDROCHLORIDE 1 MG/ML
0.4 INJECTION, SOLUTION INTRAMUSCULAR; INTRAVENOUS; SUBCUTANEOUS EVERY 5 MIN PRN
OUTPATIENT
Start: 2025-03-28

## 2025-03-28 RX ORDER — SODIUM CHLORIDE, SODIUM LACTATE, POTASSIUM CHLORIDE, CALCIUM CHLORIDE 600; 310; 30; 20 MG/100ML; MG/100ML; MG/100ML; MG/100ML
INJECTION, SOLUTION INTRAVENOUS CONTINUOUS
OUTPATIENT
Start: 2025-03-28

## 2025-03-28 RX ORDER — ACETAMINOPHEN 325 MG/1
975 TABLET ORAL ONCE
OUTPATIENT
Start: 2025-03-28 | End: 2025-03-28

## 2025-03-28 RX ORDER — LIDOCAINE 40 MG/G
CREAM TOPICAL
OUTPATIENT
Start: 2025-03-28

## 2025-03-28 RX ORDER — FENTANYL CITRATE 50 UG/ML
50 INJECTION, SOLUTION INTRAMUSCULAR; INTRAVENOUS EVERY 5 MIN PRN
OUTPATIENT
Start: 2025-03-28

## 2025-03-28 RX ORDER — OXYCODONE HYDROCHLORIDE 5 MG/1
10 TABLET ORAL
OUTPATIENT
Start: 2025-03-28

## 2025-03-28 RX ORDER — ONDANSETRON 2 MG/ML
4 INJECTION INTRAMUSCULAR; INTRAVENOUS EVERY 30 MIN PRN
OUTPATIENT
Start: 2025-03-28

## 2025-03-28 RX ORDER — FENTANYL CITRATE 50 UG/ML
25 INJECTION, SOLUTION INTRAMUSCULAR; INTRAVENOUS EVERY 5 MIN PRN
OUTPATIENT
Start: 2025-03-28

## 2025-03-31 ENCOUNTER — ANESTHESIA (OUTPATIENT)
Dept: SURGERY | Facility: CLINIC | Age: 85
End: 2025-03-31
Payer: COMMERCIAL

## 2025-05-29 RX ORDER — LEVOTHYROXINE SODIUM 75 UG/1
75 TABLET ORAL
COMMUNITY

## 2025-06-01 ASSESSMENT — COPD QUESTIONNAIRES
CAT_SEVERITY: MILD
COPD: 1

## 2025-06-01 ASSESSMENT — LIFESTYLE VARIABLES: TOBACCO_USE: 1

## 2025-06-01 NOTE — ANESTHESIA PREPROCEDURE EVALUATION
Anesthesia Pre-Procedure Evaluation    Patient: Chema Morales   MRN: [id number] : 1940          Procedure : Procedure(s):  LARYNGOSCOPY, WITH ESOPHAGOSCOPY AND ESOPHAGEAL DILATION         Past Medical History:   Diagnosis Date    GERD (gastroesophageal reflux disease)     High cholesterol     History of radiation therapy     Hoarseness 2015    after radiation    Larynx cancer (H)     scca      Past Surgical History:   Procedure Laterality Date    COLONOSCOPY      DISSECTION RADICAL NECK MODIFIED Left 2015    Procedure: DISSECTION RADICAL NECK MODIFIED;  Surgeon: Delfina Rhodes MD;  Location: [facility] OR    EBUS  9/18/15    ENDOBRONCHIAL ULTRASOUND FLEXIBLE N/A 2015    Procedure: ENDOBRONCHIAL ULTRASOUND FLEXIBLE;  Surgeon: Mendez Hutchsion MD;  Location: [facility]    INSERT PORT VASCULAR ACCESS N/A 2015    Procedure: INSERT PORT VASCULAR ACCESS;  Surgeon: Daniel Brambila MD;  Location: [facility] OR    REMOVE PORT VASCULAR ACCESS N/A 2016    Procedure: REMOVE PORT VASCULAR ACCESS;  Surgeon: Mirlande Rodriguez MD;  Location: [facility] OR    REMV/REVISN FULL ARM/LEG CAST      Left leg      Allergies   Allergen Reactions    Ampicillin Nausea    Contrast Dye Itching     Pt had itching on his tongue and cheek following injection of Isovue 370 (CT contrast)    Iodinated Contrast Media Itching     Pt had itching on his tongue and cheek following injection of Isovue 370 (CT contrast)    Neomycin Rash    Neosporin [Neomycin-Polymyxin-Gramicidin] Itching    No Clinical Screening - See Comments Itching and Rash     Bandaids and Cohesive Bandage    Tape [Adhesive Tape] Rash     Cotton and paper tape ok       Social History     Tobacco Use    Smoking status: Former     Current packs/day: 0.00     Average packs/day: 1 pack/day for 32.7 years (32.7 ttl pk-yrs)     Types: Cigarettes, Clove cigarettes or kreteks, Pipe     Start date: 1962     Quit date: 1995     Years since quittin.4     Smokeless tobacco: Never   Substance Use Topics    Alcohol use: Yes      Wt Readings from Last 1 Encounters:   02/04/25 69.2 kg (152 lb 8 oz)        Anesthesia Evaluation   Pt has had prior anesthetic. Type: General and MAC.    No history of anesthetic complications       ROS/MED HX  ENT/Pulmonary: Comment: #SCC (hypopharynx)(pyriform sinus) s/p resection and radiation   #dysphagia (cervical esophageal stenosis)    (+)                tobacco use, Past use,  20  Pack-Year Hx,       mild,  COPD,              Neurologic:  - neg neurologic ROS     Cardiovascular:  - neg cardiovascular ROS     METS/Exercise Tolerance:     Hematologic:  - neg hematologic  ROS     Musculoskeletal:  - neg musculoskeletal ROS     GI/Hepatic:     (+) GERD,                   Renal/Genitourinary:  - neg Renal ROS     Endo:  - neg endo ROS     Psychiatric/Substance Use:  - neg psychiatric ROS     Infectious Disease:       Malignancy:   (+) Malignancy, History of Other.Other CA SCC hypopharynx/pyriform sinus status post Surgery and Radiation.    Other:              Physical Exam  Airway  Mallampati: III  TM distance: >3 FB  Neck ROM: limited  Mouth opening: >= 4 cm    Cardiovascular - normal exam   Dental   (+) Edentulous      Pulmonary - normal exam      Neurological   He appears oriented x3.    Other Findings       OUTSIDE LABS:  CBC:   Lab Results   Component Value Date    WBC 7.9 09/18/2020    WBC 6.0 05/28/2019    HGB 15.3 09/18/2020    HGB 12.8 (L) 05/28/2019    HCT 43.7 09/18/2020    HCT 38.1 (L) 05/28/2019     09/18/2020     (L) 05/28/2019     BMP:   Lab Results   Component Value Date     09/18/2020     09/05/2018    POTASSIUM 4.2 09/18/2020    POTASSIUM 4.2 09/05/2018    CHLORIDE 102 09/18/2020    CHLORIDE 101 09/05/2018    CO2 25 09/18/2020    CO2 25 09/05/2018    BUN 16 09/18/2020    BUN 14 09/05/2018    CR 0.75 09/18/2020    CR 0.76 09/05/2018     (H) 09/18/2020     (H) 09/05/2018     COAGS:    Lab Results   Component Value Date    INR 1.07 09/07/2016     POC:   Lab Results   Component Value Date     (H) 09/10/2015     HEPATIC:   Lab Results   Component Value Date    ALBUMIN 3.9 09/18/2020    PROTTOTAL 7.5 09/18/2020    ALT 25 09/18/2020    AST 17 09/18/2020    ALKPHOS 49 09/18/2020    BILITOTAL 0.6 09/18/2020     OTHER:   Lab Results   Component Value Date    LACT 1.5 05/25/2015    CAROLIN 9.0 09/18/2020    MAG 2.0 07/09/2015    TSH 3.81 09/18/2020    T4 1.00 05/28/2019       Anesthesia Plan    ASA Status:  2      NPO Status: ELEVATED Aspiration Risk/Unknown   Anesthesia Type: General.  Airway: oral.  Maintenance: Balanced.   Techniques and Equipment:     - Airway:  Planned airway equipment includes video laryngoscope.     - Monitoring Plan: standard ASA monitoring, train of four monitoring, processed EEG monitor     Consents    Anesthesia Plan(s) and associated risks, benefits, and realistic alternatives discussed. Questions answered and patient/representative(s) expressed understanding.     - Discussed: anesthesiologist     - Discussed with:  Patient        - Pt is DNR/DNI Status: no DNR          Postoperative Care    Pain management: non-narcotic analgesics, plan for postoperative opioid use, multimodal analgesia.     Comments:                   Flora Zarate DO    I have reviewed the pertinent notes and labs in the chart from the past 30 days and (re)examined the patient.  Any updates or changes from those notes are reflected in this note.    Clinically Significant Risk Factors Present on Admission

## 2025-06-02 ENCOUNTER — HOSPITAL ENCOUNTER (OUTPATIENT)
Facility: CLINIC | Age: 85
Discharge: HOME | End: 2025-06-02
Attending: OTOLARYNGOLOGY | Admitting: OTOLARYNGOLOGY
Payer: COMMERCIAL

## 2025-06-02 VITALS
TEMPERATURE: 97.6 F | OXYGEN SATURATION: 98 % | RESPIRATION RATE: 17 BRPM | DIASTOLIC BLOOD PRESSURE: 99 MMHG | BODY MASS INDEX: 25.27 KG/M2 | WEIGHT: 151.68 LBS | SYSTOLIC BLOOD PRESSURE: 141 MMHG | HEIGHT: 65 IN | HEART RATE: 74 BPM

## 2025-06-02 DIAGNOSIS — R13.10 DYSPHAGIA, UNSPECIFIED TYPE: Primary | ICD-10-CM

## 2025-06-02 PROCEDURE — 258N000003 HC RX IP 258 OP 636

## 2025-06-02 PROCEDURE — 360N000076 HC SURGERY LEVEL 3, PER MIN: Performed by: OTOLARYNGOLOGY

## 2025-06-02 PROCEDURE — 999N000141 HC STATISTIC PRE-PROCEDURE NURSING ASSESSMENT: Performed by: OTOLARYNGOLOGY

## 2025-06-02 PROCEDURE — 370N000017 HC ANESTHESIA TECHNICAL FEE, PER MIN: Performed by: OTOLARYNGOLOGY

## 2025-06-02 PROCEDURE — 250N000011 HC RX IP 250 OP 636

## 2025-06-02 PROCEDURE — 250N000013 HC RX MED GY IP 250 OP 250 PS 637

## 2025-06-02 PROCEDURE — 31525 DX LARYNGOSCOPY EXCL NB: CPT | Mod: GC | Performed by: OTOLARYNGOLOGY

## 2025-06-02 PROCEDURE — 710N000012 HC RECOVERY PHASE 2, PER MINUTE: Performed by: OTOLARYNGOLOGY

## 2025-06-02 PROCEDURE — 43450 DILATE ESOPHAGUS 1/MULT PASS: CPT | Mod: 51 | Performed by: OTOLARYNGOLOGY

## 2025-06-02 PROCEDURE — 250N000025 HC SEVOFLURANE, PER MIN: Performed by: OTOLARYNGOLOGY

## 2025-06-02 PROCEDURE — 250N000009 HC RX 250

## 2025-06-02 PROCEDURE — 710N000010 HC RECOVERY PHASE 1, LEVEL 2, PER MIN: Performed by: OTOLARYNGOLOGY

## 2025-06-02 PROCEDURE — 272N000001 HC OR GENERAL SUPPLY STERILE: Performed by: OTOLARYNGOLOGY

## 2025-06-02 PROCEDURE — 258N000003 HC RX IP 258 OP 636: Performed by: STUDENT IN AN ORGANIZED HEALTH CARE EDUCATION/TRAINING PROGRAM

## 2025-06-02 RX ORDER — ACETAMINOPHEN 325 MG/1
650 TABLET ORAL EVERY 4 HOURS PRN
Qty: 50 TABLET | Refills: 0 | Status: SHIPPED | OUTPATIENT
Start: 2025-06-02

## 2025-06-02 RX ORDER — CITRIC ACID/SODIUM CITRATE 334-500MG
15 SOLUTION, ORAL ORAL ONCE
Status: COMPLETED | OUTPATIENT
Start: 2025-06-02 | End: 2025-06-02

## 2025-06-02 RX ORDER — SODIUM CHLORIDE, SODIUM LACTATE, POTASSIUM CHLORIDE, CALCIUM CHLORIDE 600; 310; 30; 20 MG/100ML; MG/100ML; MG/100ML; MG/100ML
INJECTION, SOLUTION INTRAVENOUS CONTINUOUS
Status: DISCONTINUED | OUTPATIENT
Start: 2025-06-02 | End: 2025-06-02 | Stop reason: HOSPADM

## 2025-06-02 RX ORDER — PROPOFOL 10 MG/ML
INJECTION, EMULSION INTRAVENOUS PRN
Status: DISCONTINUED | OUTPATIENT
Start: 2025-06-02 | End: 2025-06-02

## 2025-06-02 RX ORDER — LIDOCAINE HYDROCHLORIDE 20 MG/ML
INJECTION, SOLUTION INFILTRATION; PERINEURAL PRN
Status: DISCONTINUED | OUTPATIENT
Start: 2025-06-02 | End: 2025-06-02

## 2025-06-02 RX ORDER — LABETALOL HYDROCHLORIDE 5 MG/ML
10 INJECTION, SOLUTION INTRAVENOUS
Status: DISCONTINUED | OUTPATIENT
Start: 2025-06-02 | End: 2025-06-02 | Stop reason: HOSPADM

## 2025-06-02 RX ORDER — NALOXONE HYDROCHLORIDE 0.4 MG/ML
0.1 INJECTION, SOLUTION INTRAMUSCULAR; INTRAVENOUS; SUBCUTANEOUS
Status: DISCONTINUED | OUTPATIENT
Start: 2025-06-02 | End: 2025-06-02 | Stop reason: HOSPADM

## 2025-06-02 RX ORDER — DEXAMETHASONE SODIUM PHOSPHATE 4 MG/ML
4 INJECTION, SOLUTION INTRA-ARTICULAR; INTRALESIONAL; INTRAMUSCULAR; INTRAVENOUS; SOFT TISSUE
Status: DISCONTINUED | OUTPATIENT
Start: 2025-06-02 | End: 2025-06-02 | Stop reason: HOSPADM

## 2025-06-02 RX ORDER — ONDANSETRON 2 MG/ML
INJECTION INTRAMUSCULAR; INTRAVENOUS PRN
Status: DISCONTINUED | OUTPATIENT
Start: 2025-06-02 | End: 2025-06-02

## 2025-06-02 RX ORDER — OXYCODONE HYDROCHLORIDE 10 MG/1
10 TABLET ORAL
Status: DISCONTINUED | OUTPATIENT
Start: 2025-06-02 | End: 2025-06-02 | Stop reason: HOSPADM

## 2025-06-02 RX ORDER — ONDANSETRON 4 MG/1
4 TABLET, ORALLY DISINTEGRATING ORAL EVERY 30 MIN PRN
Status: DISCONTINUED | OUTPATIENT
Start: 2025-06-02 | End: 2025-06-02 | Stop reason: HOSPADM

## 2025-06-02 RX ORDER — FENTANYL CITRATE 50 UG/ML
50 INJECTION, SOLUTION INTRAMUSCULAR; INTRAVENOUS EVERY 5 MIN PRN
Status: DISCONTINUED | OUTPATIENT
Start: 2025-06-02 | End: 2025-06-02 | Stop reason: HOSPADM

## 2025-06-02 RX ORDER — DIPHENHYDRAMINE HCL 12.5MG/5ML
12.5 LIQUID (ML) ORAL EVERY 6 HOURS PRN
Status: DISCONTINUED | OUTPATIENT
Start: 2025-06-02 | End: 2025-06-02 | Stop reason: HOSPADM

## 2025-06-02 RX ORDER — HYDRALAZINE HYDROCHLORIDE 20 MG/ML
2.5-5 INJECTION INTRAMUSCULAR; INTRAVENOUS EVERY 10 MIN PRN
Status: DISCONTINUED | OUTPATIENT
Start: 2025-06-02 | End: 2025-06-02 | Stop reason: HOSPADM

## 2025-06-02 RX ORDER — ONDANSETRON 4 MG/1
4 TABLET, ORALLY DISINTEGRATING ORAL EVERY 8 HOURS PRN
Qty: 4 TABLET | Refills: 0 | Status: SHIPPED | OUTPATIENT
Start: 2025-06-02

## 2025-06-02 RX ORDER — ACETAMINOPHEN 325 MG/1
975 TABLET ORAL ONCE
Status: COMPLETED | OUTPATIENT
Start: 2025-06-02 | End: 2025-06-02

## 2025-06-02 RX ORDER — HYDROMORPHONE HCL IN WATER/PF 6 MG/30 ML
0.2 PATIENT CONTROLLED ANALGESIA SYRINGE INTRAVENOUS EVERY 5 MIN PRN
Status: DISCONTINUED | OUTPATIENT
Start: 2025-06-02 | End: 2025-06-02 | Stop reason: HOSPADM

## 2025-06-02 RX ORDER — ONDANSETRON 2 MG/ML
4 INJECTION INTRAMUSCULAR; INTRAVENOUS EVERY 30 MIN PRN
Status: DISCONTINUED | OUTPATIENT
Start: 2025-06-02 | End: 2025-06-02 | Stop reason: HOSPADM

## 2025-06-02 RX ORDER — LIDOCAINE 40 MG/G
CREAM TOPICAL
Status: DISCONTINUED | OUTPATIENT
Start: 2025-06-02 | End: 2025-06-02 | Stop reason: HOSPADM

## 2025-06-02 RX ORDER — HYDROMORPHONE HCL IN WATER/PF 6 MG/30 ML
0.4 PATIENT CONTROLLED ANALGESIA SYRINGE INTRAVENOUS EVERY 5 MIN PRN
Status: DISCONTINUED | OUTPATIENT
Start: 2025-06-02 | End: 2025-06-02 | Stop reason: HOSPADM

## 2025-06-02 RX ORDER — DEXAMETHASONE SODIUM PHOSPHATE 4 MG/ML
INJECTION, SOLUTION INTRA-ARTICULAR; INTRALESIONAL; INTRAMUSCULAR; INTRAVENOUS; SOFT TISSUE PRN
Status: DISCONTINUED | OUTPATIENT
Start: 2025-06-02 | End: 2025-06-02

## 2025-06-02 RX ORDER — OXYCODONE HYDROCHLORIDE 5 MG/1
5 TABLET ORAL
Status: DISCONTINUED | OUTPATIENT
Start: 2025-06-02 | End: 2025-06-02 | Stop reason: HOSPADM

## 2025-06-02 RX ORDER — DIPHENHYDRAMINE HYDROCHLORIDE 50 MG/ML
12.5 INJECTION, SOLUTION INTRAMUSCULAR; INTRAVENOUS EVERY 6 HOURS PRN
Status: DISCONTINUED | OUTPATIENT
Start: 2025-06-02 | End: 2025-06-02 | Stop reason: HOSPADM

## 2025-06-02 RX ORDER — FENTANYL CITRATE 50 UG/ML
INJECTION, SOLUTION INTRAMUSCULAR; INTRAVENOUS PRN
Status: DISCONTINUED | OUTPATIENT
Start: 2025-06-02 | End: 2025-06-02

## 2025-06-02 RX ORDER — FENTANYL CITRATE 50 UG/ML
25 INJECTION, SOLUTION INTRAMUSCULAR; INTRAVENOUS EVERY 5 MIN PRN
Status: DISCONTINUED | OUTPATIENT
Start: 2025-06-02 | End: 2025-06-02 | Stop reason: HOSPADM

## 2025-06-02 RX ADMIN — PROPOFOL 120 MG: 10 INJECTION, EMULSION INTRAVENOUS at 07:34

## 2025-06-02 RX ADMIN — DEXAMETHASONE SODIUM PHOSPHATE 4 MG: 4 INJECTION, SOLUTION INTRAMUSCULAR; INTRAVENOUS at 07:34

## 2025-06-02 RX ADMIN — LIDOCAINE HYDROCHLORIDE 100 MG: 20 INJECTION, SOLUTION INFILTRATION; PERINEURAL at 07:34

## 2025-06-02 RX ADMIN — PROPOFOL 40 MG: 10 INJECTION, EMULSION INTRAVENOUS at 07:51

## 2025-06-02 RX ADMIN — PHENYLEPHRINE HYDROCHLORIDE 100 MCG: 10 INJECTION INTRAVENOUS at 07:51

## 2025-06-02 RX ADMIN — SODIUM CITRATE AND CITRIC ACID MONOHYDRATE 15 ML: 500; 334 SOLUTION ORAL at 07:11

## 2025-06-02 RX ADMIN — FAMOTIDINE 20 MG: 10 INJECTION, SOLUTION INTRAVENOUS at 07:16

## 2025-06-02 RX ADMIN — SODIUM CHLORIDE, SODIUM LACTATE, POTASSIUM CHLORIDE, AND CALCIUM CHLORIDE: .6; .31; .03; .02 INJECTION, SOLUTION INTRAVENOUS at 07:28

## 2025-06-02 RX ADMIN — ONDANSETRON 4 MG: 2 INJECTION INTRAMUSCULAR; INTRAVENOUS at 08:13

## 2025-06-02 RX ADMIN — SUCCINYLCHOLINE CHLORIDE 100 MG: 20 INJECTION, SOLUTION INTRAMUSCULAR; INTRAVENOUS; PARENTERAL at 07:34

## 2025-06-02 RX ADMIN — PROPOFOL 40 MG: 10 INJECTION, EMULSION INTRAVENOUS at 07:54

## 2025-06-02 RX ADMIN — FENTANYL CITRATE 100 MCG: 50 INJECTION INTRAMUSCULAR; INTRAVENOUS at 07:34

## 2025-06-02 ASSESSMENT — ACTIVITIES OF DAILY LIVING (ADL)
ADLS_ACUITY_SCORE: 41
ADLS_ACUITY_SCORE: 40
ADLS_ACUITY_SCORE: 40
ADLS_ACUITY_SCORE: 38
ADLS_ACUITY_SCORE: 41

## 2025-06-02 NOTE — DISCHARGE INSTRUCTIONS
Contacting your Doctor -   To contact a doctor, call Dr Camarillo at the Otolaryngology/ENT clinic @ [phone]  or:  [phone] and ask for the resident on call for ENT-Otolaryngology (answered 24 hours a day)   Emergency Department:  [facility]: [phone]  [phone] if you are in need of immediate or emergent help

## 2025-06-02 NOTE — ANESTHESIA PROCEDURE NOTES
Airway       Patient location during procedure: OR       Procedure Start/Stop Times: 6/2/2025 7:37 AM  Staff -        Anesthesiologist:  Brant Kolb MD       Resident/Fellow: Flora Zarate DO       Performed By: residentIndications and Patient Condition       Indications for airway management: rory-procedural       Induction type:intravenous       Mask difficulty assessment: 1 - vent by mask    Final Airway Details       Final airway type: endotracheal airway       Successful airway: ETT - single and Oral  Endotracheal Airway Details        ETT size (mm): 6.5       Cuffed: yes       Successful intubation technique: video laryngoscopy       VL Blade Size: Glidescope 4       Grade View of Cords: 2       Adjucts: stylet       Position: Right       Measured from: lips       Secured at (cm): 22       Bite block used: None    Post intubation assessment        Placement verified by: capnometry and chest rise        Number of attempts at approach: 1       Number of other approaches attempted: 0       Secured with: tape       Ease of procedure: easy       Dentition: Intact and Unchanged    Medication(s) Administered   Medication Administration Time: 6/2/2025 7:37 AM

## 2025-06-02 NOTE — ANESTHESIA CARE TRANSFER NOTE
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    Anesthesia Type:   General     Note:    Oropharynx: oropharynx clear of all foreign objects and spontaneously breathing  Level of Consciousness: drowsy  Oxygen Supplementation: nasal cannula  Level of Supplemental Oxygen (L/min / FiO2): 4  Independent Airway: airway patency satisfactory and stable  Dentition: dentition unchanged  Vital Signs Stable: post-procedure vital signs reviewed and stable  Report to RN Given: handoff report given  Patient transferred to: PACU    Handoff Report: Identifed the Patient, Identified the Reponsible Provider, Reviewed the pertinent medical history, Discussed the surgical course, Reviewed Intra-OP anesthesia mangement and issues during anesthesia, Set expectations for post-procedure period and Allowed opportunity for questions and acknowledgement of understanding    Vitals:  Vitals Value Taken Time   /77 06/02/25 08:27   Temp     Pulse 74 06/02/25 08:29   Resp 20 06/02/25 08:29   SpO2 100 % 06/02/25 08:29   Vitals shown include unfiled device data.    Electronically Signed By: Flora Zarate DO  June 2, 2025  8:30 AM

## 2025-06-02 NOTE — BRIEF OP NOTE
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  Complications: None.  Implants: * No implants in log *

## 2025-06-02 NOTE — ANESTHESIA POSTPROCEDURE EVALUATION
Patient: Chema Morales    Procedure: Procedure(s):  LARYNGOSCOPY, WITH ESOPHAGOSCOPY AND ESOPHAGEAL DILATION       Anesthesia Type:  General    Note:  Disposition: Outpatient   Postop Pain Control: Uneventful            Sign Out: Well controlled pain   PONV: No   Neuro/Psych: Uneventful            Sign Out: Acceptable/Baseline neuro status   Airway/Respiratory: Uneventful            Sign Out: Acceptable/Baseline resp. status   CV/Hemodynamics: Uneventful            Sign Out: Acceptable CV status; No obvious hypovolemia; No obvious fluid overload   Other NRE: NONE   DID A NON-ROUTINE EVENT OCCUR?            Last vitals:  Vitals Value Taken Time   /76 06/02/25 09:00   Temp 36.4  C (97.6  F) 06/02/25 09:00   Pulse 75 06/02/25 09:03   Resp 21 06/02/25 09:03   SpO2 98 % 06/02/25 09:03   Vitals shown include unfiled device data.    Electronically Signed By: Nica Kerr MD  June 2, 2025  9:04 AM

## 2025-06-02 NOTE — OP NOTE
Otolaryngology Operative Report   Date of Operation: 2025  Patient Name: Chema Morales  Patient : 1940   Patient MRN: [id number]    Staff Surgeon: MD Jesus  Resident Surgeon: Brandon Patel MD  Preoperative Diagnosis:   1.   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 Oropharyngeal dysphagia   Postoperative Diagnosis: Same  Procedure:   1. LARYNGOSCOPY, WITH ESOPHAGOSCOPY AND ESOPHAGEAL DILATION   Anesthesia: General  Findings: DL performed. Esophageal dilation performed using 20-44F dilators.   EBL: 0 cc  Complications: None  Specimens: NA  Clinical Indications: Chema Morales is a 85 year old male with history of SCC of the pyriform sinus and was recommended to undergo aforementioned procedure. All risks and benefits were discussed with the consenting party and they elected to proceed with the procedure.  Description of Procedure: After properly identifying the patient and obtaining signed informed consent, the patient was brought to the operating room and laid in supine position on the operating table.  General anesthesia was induced and the patient was ventilated through masking.  The bed was rotated 90 degrees.  The patient was draped in the usual clean fashion.  A timeout was carried out immediately prior to the procedure to confirm the identity of the patient and correctness of the procedure. The Dedo-  laryngoscope was then used to gain access to the larynx. The larynx, was examined which was unremarkable. The piriformis was visualized. The PE inlet was dilated using dilators ranging from 20-44F. The patient did well with no signs of bleeding. The laryngoscope was removed.  Mask ventilation was established. The patient was then turned over to Anesthesia for emergence and was masked until awake.  This concluded our procedure. The patient was then turned over to our anesthesia colleagues and was extubated and taken to the PACU in satisfactory and stable condition.     "Jesus was present for the procedure     Brandon \"Steve\" MD Jorge  Otolaryngology-Head & Neck Surgery PGY-1   "

## 2025-06-09 NOTE — PROGRESS NOTES
"  Otolaryngology Clinic    Name: Chema Morales  MRN: [id number]  Age: 85 year old  : 1940  06/10/2025      Chief Complaint:   Follow up     History of Present Illness:   Chema Morales is a 85 year old male with a history of squamous cell carcinoma of pyriform sinus and oropharyngeal dysphagia  who presents for follow up. Last seen in ENT by me on 2025.    Today, he reports spot on the right side of mouth that causes him pain.     Review of Systems:   Pertinent items are noted in HPI or as in patient entered ROS below, remainder of complete ROS is negative.       2025     3:19 PM    ENT ROS   Ears, Nose, Throat Hoarseness   Cardiopulmonary Cough   Gastrointestinal/Genitourinary Heartburn/indigestion    Constipation   Musculoskeletal Back pain   Endocrine Frequent urination        Physical Exam:   /76   Pulse 73   Temp 97.4  F (36.3  C)   Resp 12   Ht 1.651 m (5' 5\")   Wt 68.9 kg (151 lb 14.4 oz)   SpO2 96%   BMI 25.28 kg/m       PHYSICAL EXAMINATION:        ECOG PFS 0   Constitutional:  The patient was unaccompanied, well-groomed, and in no acute distress.    Skin:  Warm and pink.    Neurologic:  Alert and oriented x 3.  CN's III-XII within normal limits.  Voice normal.   Psychiatric:  The patient's affect was calm, cooperative, and appropriate.    Respiratory:  Breathing comfortably without stridor or exertion of accessory muscles.    Eyes: Extraocular movement intact.    Head:  Normocephalic and atraumatic.  No lesions or scars.    Ears:  Pinnae and tragus non-tender.  EAC's and TM's were clear.    Nose:  Sinuses were non-tender.  Anterior rhinoscopy revealed midline septum and absence of purulence or polyps.    OC/OP:  Normal tongue, floor of mouth, buccal mucosa, and palate.  No lesions or masses on inspection or palpation.  No abnormal lymph tissue in the oropharynx.  The pterygoid region is non-tender. 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 "   Neck:  Supple with normal laryngeal and tracheal landmarks.  The parotid beds were without masses.  No palpable thyroid.  Lymphatic:  There is no palpable lymphadenopathy in the neck.   Lungs: CTA  CV  RRR and 2+ UE pulses  ABD: normal bs and softy  LE- zero edema at ankles/       Roof of right side of mouth Biopsy:  Roof of right side of mouth was injected with 1% lidocaine with epinephrine. Celia forceps used to remove tissue from roof of right side of mouth.        Assessment and Plan:    ICD-10-CM    1. Squamous cell carcinoma of pyriform sinus (H)  C12 CBC with platelets and differential     CRP, inflammation     Hemoglobin A1c     Comprehensive metabolic panel (BMP + Alb, Alk Phos, ALT, AST, Total. Bili, TP)     Surgical Pathology Exam        Chema Morales is a 85 year old male with a history of squamous cell carcinoma of pyriform sinus and oropharyngeal dysphagia  who presents for follow up. We had a discussion about biopsying the roof of the right side of mouth, and he agreed to proceed. A biopsy was performed today in the office and we will send tissue to pathology. Labs were ordered (CBC with platelets, CRP inflammation, hemoglobin A1c, and CMP).       Follow-up: No follow-ups on file.         Scribe Disclosure:  Heidy ROBLEDO, am serving as a scribe to document services personally performed by Jhoan Lee MD at this visit, based upon the provider's statements to me. All documentation has been reviewed by the aforementioned provider prior to being entered into the official medical record.

## 2025-06-10 ENCOUNTER — RESULTS FOLLOW-UP (OUTPATIENT)
Dept: OTOLARYNGOLOGY | Facility: CLINIC | Age: 85
End: 2025-06-10

## 2025-06-10 ENCOUNTER — LAB (OUTPATIENT)
Dept: LAB | Facility: CLINIC | Age: 85
End: 2025-06-10
Payer: COMMERCIAL

## 2025-06-10 ENCOUNTER — OFFICE VISIT (OUTPATIENT)
Dept: OTOLARYNGOLOGY | Facility: CLINIC | Age: 85
End: 2025-06-10
Payer: COMMERCIAL

## 2025-06-10 VITALS
TEMPERATURE: 97.4 F | SYSTOLIC BLOOD PRESSURE: 134 MMHG | RESPIRATION RATE: 12 BRPM | OXYGEN SATURATION: 96 % | DIASTOLIC BLOOD PRESSURE: 76 MMHG | WEIGHT: 151.9 LBS | BODY MASS INDEX: 25.31 KG/M2 | HEART RATE: 73 BPM | HEIGHT: 65 IN

## 2025-06-10 DIAGNOSIS — C12 SQUAMOUS CELL CARCINOMA OF PYRIFORM SINUS (H): ICD-10-CM

## 2025-06-10 DIAGNOSIS — C12 SQUAMOUS CELL CARCINOMA OF PYRIFORM SINUS (H): Primary | ICD-10-CM

## 2025-06-10 LAB
ALBUMIN SERPL BCG-MCNC: 4.4 G/DL (ref 3.5–5.2)
ALP SERPL-CCNC: 53 U/L (ref 40–150)
ALT SERPL W P-5'-P-CCNC: 18 U/L (ref 0–70)
ANION GAP SERPL CALCULATED.3IONS-SCNC: 10 MMOL/L (ref 7–15)
AST SERPL W P-5'-P-CCNC: 19 U/L (ref 0–45)
BASOPHILS # BLD AUTO: 0 10E3/UL (ref 0–0.2)
BASOPHILS NFR BLD AUTO: 1 %
BILIRUB SERPL-MCNC: 0.8 MG/DL
BUN SERPL-MCNC: 10 MG/DL (ref 8–23)
CALCIUM SERPL-MCNC: 9.2 MG/DL (ref 8.8–10.4)
CHLORIDE SERPL-SCNC: 103 MMOL/L (ref 98–107)
CREAT SERPL-MCNC: 0.72 MG/DL (ref 0.67–1.17)
CRP SERPL-MCNC: <3 MG/L
EGFRCR SERPLBLD CKD-EPI 2021: 90 ML/MIN/1.73M2
EOSINOPHIL # BLD AUTO: 0.2 10E3/UL (ref 0–0.7)
EOSINOPHIL NFR BLD AUTO: 6 %
ERYTHROCYTE [DISTWIDTH] IN BLOOD BY AUTOMATED COUNT: 13.2 % (ref 10–15)
EST. AVERAGE GLUCOSE BLD GHB EST-MCNC: 114 MG/DL
GLUCOSE SERPL-MCNC: 109 MG/DL (ref 70–99)
HBA1C MFR BLD: 5.6 %
HCO3 SERPL-SCNC: 23 MMOL/L (ref 22–29)
HCT VFR BLD AUTO: 43.3 % (ref 40–53)
HGB BLD-MCNC: 15 G/DL (ref 13.3–17.7)
IMM GRANULOCYTES # BLD: 0 10E3/UL
IMM GRANULOCYTES NFR BLD: 0 %
LYMPHOCYTES # BLD AUTO: 1 10E3/UL (ref 0.8–5.3)
LYMPHOCYTES NFR BLD AUTO: 23 %
MCH RBC QN AUTO: 32 PG (ref 26.5–33)
MCHC RBC AUTO-ENTMCNC: 34.6 G/DL (ref 31.5–36.5)
MCV RBC AUTO: 92 FL (ref 78–100)
MONOCYTES # BLD AUTO: 0.4 10E3/UL (ref 0–1.3)
MONOCYTES NFR BLD AUTO: 10 %
NEUTROPHILS # BLD AUTO: 2.5 10E3/UL (ref 1.6–8.3)
NEUTROPHILS NFR BLD AUTO: 60 %
NRBC # BLD AUTO: 0 10E3/UL
NRBC BLD AUTO-RTO: 0 /100
PLATELET # BLD AUTO: 151 10E3/UL (ref 150–450)
POTASSIUM SERPL-SCNC: 4.3 MMOL/L (ref 3.4–5.3)
PROT SERPL-MCNC: 6.6 G/DL (ref 6.4–8.3)
RBC # BLD AUTO: 4.69 10E6/UL (ref 4.4–5.9)
SODIUM SERPL-SCNC: 136 MMOL/L (ref 135–145)
WBC # BLD AUTO: 4.2 10E3/UL (ref 4–11)

## 2025-06-10 PROCEDURE — 36415 COLL VENOUS BLD VENIPUNCTURE: CPT | Performed by: PATHOLOGY

## 2025-06-10 PROCEDURE — 88305 TISSUE EXAM BY PATHOLOGIST: CPT | Mod: TC | Performed by: OTOLARYNGOLOGY

## 2025-06-10 PROCEDURE — 85025 COMPLETE CBC W/AUTO DIFF WBC: CPT | Performed by: PATHOLOGY

## 2025-06-10 PROCEDURE — 88305 TISSUE EXAM BY PATHOLOGIST: CPT | Mod: 26 | Performed by: STUDENT IN AN ORGANIZED HEALTH CARE EDUCATION/TRAINING PROGRAM

## 2025-06-10 PROCEDURE — 86140 C-REACTIVE PROTEIN: CPT | Performed by: PATHOLOGY

## 2025-06-10 PROCEDURE — 80053 COMPREHEN METABOLIC PANEL: CPT | Performed by: PATHOLOGY

## 2025-06-10 PROCEDURE — 99000 SPECIMEN HANDLING OFFICE-LAB: CPT | Performed by: PATHOLOGY

## 2025-06-10 PROCEDURE — 83036 HEMOGLOBIN GLYCOSYLATED A1C: CPT | Performed by: OTOLARYNGOLOGY

## 2025-06-10 ASSESSMENT — PAIN SCALES - GENERAL: PAINLEVEL_OUTOF10: NO PAIN (0)

## 2025-06-10 NOTE — LETTER
"6/10/2025       RE: Chema Morales  [address]  [facility] [address]     Dear Colleague,    Thank you for referring your patient, Chema Morales, to the [facility] EAR NOSE AND THROAT CLINIC [place] at [facility]. Please see a copy of my visit note below.      Otolaryngology Clinic    Name: Chema Morales  MRN: [id number]  Age: 85 year old  : 1940  06/10/2025      Chief Complaint:   Follow up     History of Present Illness:   Chema Morales is a 85 year old male with a history of squamous cell carcinoma of pyriform sinus and oropharyngeal dysphagia  who presents for follow up. Last seen in ENT by me on 2025.    Today, he reports spot on the right side of mouth that causes him pain.     Review of Systems:   Pertinent items are noted in HPI or as in patient entered ROS below, remainder of complete ROS is negative.       2025     3:19 PM    ENT ROS   Ears, Nose, Throat Hoarseness   Cardiopulmonary Cough   Gastrointestinal/Genitourinary Heartburn/indigestion    Constipation   Musculoskeletal Back pain   Endocrine Frequent urination        Physical Exam:   /76   Pulse 73   Temp 97.4  F (36.3  C)   Resp 12   Ht 1.651 m (5' 5\")   Wt 68.9 kg (151 lb 14.4 oz)   SpO2 96%   BMI 25.28 kg/m       PHYSICAL EXAMINATION:        ECOG PFS 0   Constitutional:  The patient was unaccompanied, well-groomed, and in no acute distress.    Skin:  Warm and pink.    Neurologic:  Alert and oriented x 3.  CN's III-XII within normal limits.  Voice normal.   Psychiatric:  The patient's affect was calm, cooperative, and appropriate.    Respiratory:  Breathing comfortably without stridor or exertion of accessory muscles.    Eyes: Extraocular movement intact.    Head:  Normocephalic and atraumatic.  No lesions or scars.    Ears:  Pinnae and tragus non-tender.  EAC's and TM's were clear.    Nose:  Sinuses were non-tender.  Anterior rhinoscopy " revealed midline septum and absence of purulence or polyps.    OC/OP:  Normal tongue, floor of mouth, buccal mucosa, and palate.  No lesions or masses on inspection or palpation.  No abnormal lymph tissue in the oropharynx.  The pterygoid region is non-tender. 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   Neck:  Supple with normal laryngeal and tracheal landmarks.  The parotid beds were without masses.  No palpable thyroid.  Lymphatic:  There is no palpable lymphadenopathy in the neck.   Lungs: CTA  CV  RRR and 2+ UE pulses  ABD: normal bs and softy  LE- zero edema at ankles/       Roof of right side of mouth Biopsy:  Roof of right side of mouth was injected with 1% lidocaine with epinephrine. Celia forceps used to remove tissue from roof of right side of mouth.        Assessment and Plan:    ICD-10-CM    1. Squamous cell carcinoma of pyriform sinus (H)  C12 CBC with platelets and differential     CRP, inflammation     Hemoglobin A1c     Comprehensive metabolic panel (BMP + Alb, Alk Phos, ALT, AST, Total. Bili, TP)     Surgical Pathology Exam        Chema Morales is a 85 year old male with a history of squamous cell carcinoma of pyriform sinus and oropharyngeal dysphagia  who presents for follow up. We had a discussion about biopsying the roof of the right side of mouth, and he agreed to proceed. A biopsy was performed today in the office and we will send tissue to pathology. Labs were ordered (CBC with platelets, CRP inflammation, hemoglobin A1c, and CMP).       Follow-up: No follow-ups on file.         Scribe Disclosure:  IHeidy, am serving as a scribe to document services personally performed by Jhoan Lee MD at this visit, based upon the provider's statements to me. All documentation has been reviewed by the aforementioned provider prior to being entered into the official medical record.         Again, thank you for allowing me to participate in the care of  your patient.      Sincerely,    Jhoan Lee MD

## 2025-06-11 LAB
PATH REPORT.COMMENTS IMP SPEC: NORMAL
PATH REPORT.COMMENTS IMP SPEC: NORMAL
PATH REPORT.FINAL DX SPEC: NORMAL
PATH REPORT.GROSS SPEC: NORMAL
PATH REPORT.MICROSCOPIC SPEC OTHER STN: NORMAL
PATH REPORT.RELEVANT HX SPEC: NORMAL
PHOTO IMAGE: NORMAL

## 2025-07-29 ENCOUNTER — TELEPHONE (OUTPATIENT)
Dept: OTOLARYNGOLOGY | Facility: CLINIC | Age: 85
End: 2025-07-29

## 2025-07-29 ENCOUNTER — OFFICE VISIT (OUTPATIENT)
Dept: OTOLARYNGOLOGY | Facility: CLINIC | Age: 85
End: 2025-07-29
Payer: COMMERCIAL

## 2025-07-29 VITALS
BODY MASS INDEX: 25.64 KG/M2 | SYSTOLIC BLOOD PRESSURE: 146 MMHG | HEIGHT: 65 IN | HEART RATE: 74 BPM | WEIGHT: 153.9 LBS | OXYGEN SATURATION: 95 % | DIASTOLIC BLOOD PRESSURE: 78 MMHG

## 2025-07-29 DIAGNOSIS — C12 SQUAMOUS CELL CARCINOMA OF PYRIFORM SINUS (H): Primary | ICD-10-CM

## 2025-07-29 PROCEDURE — 3077F SYST BP >= 140 MM HG: CPT | Performed by: OTOLARYNGOLOGY

## 2025-07-29 PROCEDURE — 1126F AMNT PAIN NOTED NONE PRSNT: CPT | Performed by: OTOLARYNGOLOGY

## 2025-07-29 PROCEDURE — 99213 OFFICE O/P EST LOW 20 MIN: CPT | Performed by: OTOLARYNGOLOGY

## 2025-07-29 PROCEDURE — 3078F DIAST BP <80 MM HG: CPT | Performed by: OTOLARYNGOLOGY

## 2025-07-29 ASSESSMENT — PAIN SCALES - GENERAL: PAINLEVEL_OUTOF10: NO PAIN (0)

## 2025-07-29 NOTE — LETTER
"2025       RE: Chema Morales  [address]  [facility] [address]     Dear Colleague,    Thank you for referring your patient, Chema Morales, to the [facility] EAR NOSE AND THROAT CLINIC [place] at [facility]. Please see a copy of my visit note below.      Otolaryngology Clinic    Name: Chema Morales  MRN: [id number]  Age: 85 year old  : 2025      Chief Complaint:   Follow up     History of Present Illness:   Chema Morales is a 85 year old male with a history of squamous cell carcinoma of pyriform sinus and oropharyngeal dysphagia who presents for follow up. Last seen in ENT by me on 06/10/2025. At that time, a biopsy was done in office     Today, he reports that he doesn't cough as much as he use to, swallowing is getting better after dilation and is feeling better.     Review of Systems:   Pertinent items are noted in HPI or as in patient entered ROS below, remainder of complete ROS is negative.       2025     1:28 PM    ENT ROS   Gastrointestinal/Genitourinary Heartburn/indigestion    Constipation   Musculoskeletal Back pain   Endocrine Frequent urination        Physical Exam:   BP (!) 146/78   Pulse 74   Ht 1.651 m (5' 5\")   Wt 69.8 kg (153 lb 14.4 oz)   SpO2 95%   BMI 25.61 kg/m       PHYSICAL EXAMINATION:    Constitutional:  The patient was unaccompanied, well-groomed, and in no acute distress.    Skin:  Warm and pink.    Neurologic:  Alert and oriented x 3.  CN's III-XII within normal limits.  Voice normal.   Psychiatric:  The patient's affect was calm, cooperative, and appropriate.    Respiratory:  Breathing comfortably without stridor or exertion of accessory muscles.    Eyes: Extraocular movement intact.    Head:  Normocephalic and atraumatic.  No lesions or scars.    Ears:  Pinnae and tragus non-tender.  EAC's and TM's were clear.    Nose:  Sinuses were non-tender.  Anterior rhinoscopy " "revealed midline septum and absence of purulence or polyps.    OC/OP:  Normal tongue, floor of mouth, buccal mucosa, and palate.  No lesions or masses on inspection or palpation.  No abnormal lymph tissue in the oropharynx.  The pterygoid region is non-tender. 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   Neck:  Supple with normal laryngeal and tracheal landmarks.  The parotid beds were without masses.  No palpable thyroid.  Lymphatic:  There is no palpable lymphadenopathy in the neck.    Pathology:  06/10/2025 Surgical Pathology  FINAL DIAGNOSIS:  A. ORAL CAVITY, RIGHT UPPER GINGIVAL LESION:  - Squamous mucosa with mild hyperkeratosis and hypergranulosis  - Negative for high grade dysplasia/carcinoma     B. ORAL CAVITY, RIGHT BUCCAL NORMAL TISSUE:  - Benign squamous mucosa  - Negative for high grade dysplasia/carcinoma    MICROSCOPIC DESCRIPTION:  A(A). Mouth, right upper gingival lesion:  The specimen is received in formalin with proper patient identification, labeled \"mouth, right upper gingival lesion biopsy\".  The specimen consists of a 0.4 cm in greatest dimension, tan-white, irregular fragment of mucosal tissue.  The surgical margin is inked black.  The specimen is submitted in toto as A1.              B(B). Mouth, right buccal normal tissue:  The specimen is received in formalin with proper patient identification, labeled \"mouth, right buccal normal tissue biopsy\".  The specimen consists of a single 0.5 cm in greatest dimension, gray-tan to pink, irregular fragment of glistening mucosa.  The surgical margin is inked black.  The specimen is submitted in toto as B1.     Assessment and Plan:  No diagnosis found.  Chema Morales is a 85 year old male with a history of squamous cell carcinoma of pyriform sinus and oropharyngeal dysphagia who presents for follow up. The roof of his mouth is healing well. 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 No evidence of any dysplasia found.    Follow-up: 6 months         Scribe Disclosure:  I, Karen Paul, am serving as a scribe to document services personally performed by Jhoan Lee MD at this visit, based upon the provider's statements to me. All documentation has been reviewed by the aforementioned provider prior to being entered into the official medical record.    Again, thank you for allowing me to participate in the care of your patient.      Sincerely,    Jhoan Lee MD     [employer]

## 2025-07-29 NOTE — PROGRESS NOTES
"  Otol[facility]ology Clinic    Name: Chema Morales  MRN: [id number]  Age: 85 year old  : 2025      Chief Complaint:   Follow up     History of Present Illness:   Chema Morales is a 85 year old male with a history of squamous cell carcinoma of pyriform sinus and oropharyngeal dysphagia who presents for follow up. Last seen in ENT by me on 06/10/2025. At that time, a biopsy was done in office     Today, he reports that he doesn't cough as much as he use to, swallowing is getting better after dilation and is feeling better.     Review of Systems:   Pertinent items are noted in HPI or as in patient entered ROS below, remainder of complete ROS is negative.       2025     1:28 PM    ENT ROS   Gastrointestinal/Genitourinary Heartburn/indigestion    Constipation   Musculoskeletal Back pain   Endocrine Frequent urination        Physical Exam:   BP (!) 146/78   Pulse 74   Ht 1.651 m (5' 5\")   Wt 69.8 kg (153 lb 14.4 oz)   SpO2 95%   BMI 25.61 kg/m       PHYSICAL EXAMINATION:    Constitutional:  The patient was unaccompanied, well-groomed, and in no acute distress.    Skin:  Warm and pink.    Neurologic:  Alert and oriented x 3.  CN's III-XII within normal limits.  Voice normal.   Psychiatric:  The patient's affect was calm, cooperative, and appropriate.    Respiratory:  Breathing comfortably without stridor or exertion of accessory muscles.    Eyes: Extraocular movement intact.    Head:  Normocephalic and atraumatic.  No lesions or scars.    Ears:  Pinnae and tragus non-tender.  EAC's and TM's were clear.    Nose:  Sinuses were non-tender.  Anterior rhinoscopy revealed midline septum and absence of purulence or polyps.    OC/OP:  Normal tongue, floor of mouth, buccal mucosa, and palate.  No lesions or masses on inspection or palpation.  No abnormal lymph tissue in the oropharynx.  The pterygoid region is non-tender. 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 " "  Neck:  Supple with normal laryngeal and tracheal landmarks.  The parotid beds were without masses.  No palpable thyroid.  Lymphatic:  There is no palpable lymphadenopathy in the neck.    Pathology:  06/10/2025 Surgical Pathology  FINAL DIAGNOSIS:  A. ORAL CAVITY, RIGHT UPPER GINGIVAL LESION:  - Squamous mucosa with mild hyperkeratosis and hypergranulosis  - Negative for high grade dysplasia/carcinoma     B. ORAL CAVITY, RIGHT BUCCAL NORMAL TISSUE:  - Benign squamous mucosa  - Negative for high grade dysplasia/carcinoma    MICROSCOPIC DESCRIPTION:  A(A). Mouth, right upper gingival lesion:  The specimen is received in formalin with proper patient identification, labeled \"mouth, right upper gingival lesion biopsy\".  The specimen consists of a 0.4 cm in greatest dimension, tan-white, irregular fragment of mucosal tissue.  The surgical margin is inked black.  The specimen is submitted in toto as A1.              B(B). Mouth, right buccal normal tissue:  The specimen is received in formalin with proper patient identification, labeled \"mouth, right buccal normal tissue biopsy\".  The specimen consists of a single 0.5 cm in greatest dimension, gray-tan to pink, irregular fragment of glistening mucosa.  The surgical margin is inked black.  The specimen is submitted in toto as B1.     Assessment and Plan:  No diagnosis found.  Chema Morales is a 85 year old male with a history of squamous cell carcinoma of pyriform sinus and oropharyngeal dysphagia who presents for follow up. The roof of his mouth is healing well. 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 No evidence of any dysplasia found.    Follow-up: 6 months         Scribe Disclosure:  Karen ROBLEDO, am serving as a scribe to document services personally performed by Jhoan Lee MD at this visit, based upon the provider's statements to me. All documentation has been reviewed by the aforementioned " provider prior to being entered into the official medical record.

## 2025-07-29 NOTE — TELEPHONE ENCOUNTER
Left Voicemail (1st Attempt) for the patient to call back and schedule the following:    Appointment type: RETURN ENT  Provider:   Return date: around 1/27/2026  ENT Clinic  phone number: [phone]  Additional appointment(s) needed: NONE  Additional Notes: Follow up 6 months       Made recall letter to schedule from.

## (undated) DEVICE — SUCTION MANIFOLD NEPTUNE 2 SYS 4 PORT 0702-020-000

## (undated) DEVICE — SOL NACL 0.9% IRRIG 1000ML BOTTLE 2F7124

## (undated) DEVICE — GLOVE PROTEXIS MICRO 8.0 LT BLUE 2D73PM80

## (undated) DEVICE — ENDO TOOTH GUARD SAC2001

## (undated) DEVICE — PACK ENT ENDOSCOPY UMMC

## (undated) DEVICE — LINEN TOWEL PACK X5 5464

## (undated) RX ORDER — ACETAMINOPHEN 325 MG/1
TABLET ORAL
Status: DISPENSED
Start: 2025-06-02

## (undated) RX ORDER — CITRIC ACID/SODIUM CITRATE 334-500MG
SOLUTION, ORAL ORAL
Status: DISPENSED
Start: 2025-06-02

## (undated) RX ORDER — DEXAMETHASONE SODIUM PHOSPHATE 4 MG/ML
INJECTION, SOLUTION INTRA-ARTICULAR; INTRALESIONAL; INTRAMUSCULAR; INTRAVENOUS; SOFT TISSUE
Status: DISPENSED
Start: 2025-06-02

## (undated) RX ORDER — FENTANYL CITRATE 50 UG/ML
INJECTION, SOLUTION INTRAMUSCULAR; INTRAVENOUS
Status: DISPENSED
Start: 2025-06-02

## (undated) RX ORDER — ONDANSETRON 2 MG/ML
INJECTION INTRAMUSCULAR; INTRAVENOUS
Status: DISPENSED
Start: 2025-06-02

## (undated) RX ORDER — OXYMETAZOLINE HYDROCHLORIDE 0.05 G/100ML
SPRAY NASAL
Status: DISPENSED
Start: 2025-06-02

## (undated) RX ORDER — PANTOPRAZOLE SODIUM 40 MG/10ML
INJECTION, POWDER, LYOPHILIZED, FOR SOLUTION INTRAVENOUS
Status: DISPENSED
Start: 2025-06-02

## (undated) RX ORDER — FENTANYL CITRATE-0.9 % NACL/PF 10 MCG/ML
PLASTIC BAG, INJECTION (ML) INTRAVENOUS
Status: DISPENSED
Start: 2025-06-02

## (undated) RX ORDER — PROPOFOL 10 MG/ML
INJECTION, EMULSION INTRAVENOUS
Status: DISPENSED
Start: 2025-06-02